# Patient Record
Sex: FEMALE | Race: WHITE | Employment: FULL TIME | URBAN - METROPOLITAN AREA
[De-identification: names, ages, dates, MRNs, and addresses within clinical notes are randomized per-mention and may not be internally consistent; named-entity substitution may affect disease eponyms.]

---

## 2018-03-12 ENCOUNTER — OFFICE VISIT (OUTPATIENT)
Dept: FAMILY MEDICINE CLINIC | Age: 48
End: 2018-03-12
Payer: COMMERCIAL

## 2018-03-12 VITALS
RESPIRATION RATE: 18 BRPM | SYSTOLIC BLOOD PRESSURE: 110 MMHG | OXYGEN SATURATION: 99 % | HEIGHT: 62 IN | DIASTOLIC BLOOD PRESSURE: 76 MMHG | WEIGHT: 153 LBS | BODY MASS INDEX: 28.16 KG/M2 | HEART RATE: 89 BPM | TEMPERATURE: 97.7 F

## 2018-03-12 DIAGNOSIS — M25.561 CHRONIC PAIN OF BOTH KNEES: ICD-10-CM

## 2018-03-12 DIAGNOSIS — J45.20 MILD INTERMITTENT ASTHMA WITHOUT COMPLICATION: ICD-10-CM

## 2018-03-12 DIAGNOSIS — G89.29 CHRONIC PAIN OF BOTH KNEES: ICD-10-CM

## 2018-03-12 DIAGNOSIS — M54.41 ACUTE BILATERAL LOW BACK PAIN WITH BILATERAL SCIATICA: Primary | ICD-10-CM

## 2018-03-12 DIAGNOSIS — M25.562 CHRONIC PAIN OF BOTH KNEES: ICD-10-CM

## 2018-03-12 DIAGNOSIS — M54.42 ACUTE BILATERAL LOW BACK PAIN WITH BILATERAL SCIATICA: Primary | ICD-10-CM

## 2018-03-12 PROCEDURE — 99214 OFFICE O/P EST MOD 30 MIN: CPT | Performed by: FAMILY MEDICINE

## 2018-03-12 PROCEDURE — G8419 CALC BMI OUT NRM PARAM NOF/U: HCPCS | Performed by: FAMILY MEDICINE

## 2018-03-12 PROCEDURE — 1036F TOBACCO NON-USER: CPT | Performed by: FAMILY MEDICINE

## 2018-03-12 PROCEDURE — G8427 DOCREV CUR MEDS BY ELIG CLIN: HCPCS | Performed by: FAMILY MEDICINE

## 2018-03-12 PROCEDURE — G8484 FLU IMMUNIZE NO ADMIN: HCPCS | Performed by: FAMILY MEDICINE

## 2018-03-12 RX ORDER — FLUTICASONE PROPIONATE 110 UG/1
1 AEROSOL, METERED RESPIRATORY (INHALATION) 2 TIMES DAILY
Qty: 1 INHALER | Refills: 3 | Status: SHIPPED | OUTPATIENT
Start: 2018-03-12 | End: 2018-10-25 | Stop reason: SDUPTHER

## 2018-03-12 RX ORDER — CYCLOBENZAPRINE HCL 5 MG
5 TABLET ORAL NIGHTLY PRN
Qty: 30 TABLET | Refills: 0 | Status: SHIPPED | OUTPATIENT
Start: 2018-03-12 | End: 2019-03-04

## 2018-03-12 RX ORDER — IBUPROFEN 800 MG/1
800 TABLET ORAL 2 TIMES DAILY PRN
Qty: 14 TABLET | Refills: 0 | Status: SHIPPED | OUTPATIENT
Start: 2018-03-12 | End: 2018-04-11

## 2018-03-12 RX ORDER — ALBUTEROL SULFATE 90 UG/1
2 AEROSOL, METERED RESPIRATORY (INHALATION) EVERY 6 HOURS PRN
Qty: 1 INHALER | Refills: 3 | Status: SHIPPED | OUTPATIENT
Start: 2018-03-12 | End: 2018-10-25 | Stop reason: SDUPTHER

## 2018-03-12 ASSESSMENT — PATIENT HEALTH QUESTIONNAIRE - PHQ9
SUM OF ALL RESPONSES TO PHQ QUESTIONS 1-9: 0
SUM OF ALL RESPONSES TO PHQ9 QUESTIONS 1 & 2: 0
2. FEELING DOWN, DEPRESSED OR HOPELESS: 0
1. LITTLE INTEREST OR PLEASURE IN DOING THINGS: 0

## 2018-03-12 NOTE — PROGRESS NOTES
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 patient that presents today for back pain. Back Pain: Patient presents for presents evaluation of back problems. Pain involves low lack and right knee. This is a/an recent problem. This has been going on for 3 week(s). Exacerbating factors include movement. Alleviating factors include nothing. Pain is intermittent in nature. Pain is not radiating. This has happen to patient before. Previous treatments: time. Prior, has gone away on its own, but this has been prolonged. Does deny bowel or bladder incontinence or saddle anesthesia. Has had sciatic pain in the past and this does not feel similar. No injury. Also complaining of bilateral knee pain. 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 Stiff. Hard to get out of bed in AM. Ibuprofen helps. Patient's past medical, surgical, social and/or family history reviewed, updated in chart, and are non-contributory (unless otherwise stated). Medications and allergies also reviewed and updated in chart.      ROS  Review of Systems - General ROS: negative for - chills, fatigue, fever, night sweats, sleep disturbance, weight gain or weight loss  Psychological ROS: negative for - anxiety, behavioral disorder, depression, hallucinations, irritability, memory difficulties, mood swings, sleep disturbances or suicidal ideation  ENT ROS: negative for - epistaxis, headaches, hearing change, nasal congestion, nasal discharge, nasal polyps, sinus pain, tinnitus, vertigo or visual changes  Hematological and Lymphatic ROS: negative for - bleeding problems, blood clots, fatigue or swollen lymph nodes  Respiratory ROS: negative for - cough, orthopnea, shortness of breath, sputum changes, tachypnea or wheezing  Cardiovascular ROS: negative for - chest pain, dyspnea on exertion, irregular heartbeat, loss of consciousness, palpitations, paroxysmal nocturnal dyspnea or rapid heart rate  Gastrointestinal ROS: negative for - abdominal pain, blood in stools, change in bowel habits, constipation, diarrhea, gas/bloating, heartburn or nausea/vomiting  Musculoskeletal ROS: negative for - joint pain, joint stiffness, joint swelling or muscle, back pain, bowel or bladder incontinence  Neurological ROS: negative for - behavioral changes, confusion, dizziness, headaches, memory loss, numbness/tingling, seizures or speech problems, weakness  Dermatological ROS: negative for - dry skin, mole changes, nail changes, pruritus, rash or skin lesion changes    Physical Exam    Wt Readings from Last 3 Encounters:   03/12/18 153 lb (69.4 kg)   12/06/16 135 lb (61.2 kg)   12/05/16 138 lb (62.6 kg)     BP Readings from Last 3 Encounters:   03/12/18 110/76   12/06/16 100/64   12/05/16 118/74     Pulse Readings from Last 3 Encounters:   03/12/18 89   12/06/16 92   12/05/16 90       General appearance: alert, well appearing, and in no distress, oriented to person, place, and time and normal appearing weight. CVS exam: normal rate, regular rhythm, normal S1, S2, no murmurs, rubs, clicks or gallops. Radial pulses 2+ bilateral.  PT/DP pulse 2+ bilat. No C/C/E    Chest: clear to auscultation, no wheezes, rales or rhonchi, symmetric air entry. Musculoskeletal: MS 5/5, DTR 2/4 x4 extremities, FROM F/E spine, LS tenderness, no obvious masses or deformities.  Gait normal. Leaning over exam table  FROM knees-B, B joint line tenderness, negative A/P drawer, crepitus    Abdomen: Soft, non-tender, non-distended, positive BS in all 4 quadrants    Extremities:Dorsalis pedis pulses palpated bilaterally, no clubbing, cyanosis, edema or erythema     SKIN: no lesions, jaundice, petechiae, pallor, cyanosis, ecchymosis    NEURO: gross motor exam normal by observation, gait normal      Assessment/Plan  James Bangura was seen today for back pain and

## 2018-04-03 ENCOUNTER — HOSPITAL ENCOUNTER (EMERGENCY)
Age: 48
Discharge: HOME | End: 2018-04-03
Attending: EMERGENCY MEDICINE
Payer: COMMERCIAL

## 2018-04-03 ENCOUNTER — TELEPHONE (OUTPATIENT)
Dept: FAMILY MEDICINE CLINIC | Age: 48
End: 2018-04-03

## 2018-04-03 VITALS
BODY MASS INDEX: 28.16 KG/M2 | RESPIRATION RATE: 14 BRPM | SYSTOLIC BLOOD PRESSURE: 106 MMHG | HEART RATE: 106 BPM | DIASTOLIC BLOOD PRESSURE: 70 MMHG | OXYGEN SATURATION: 98 % | HEIGHT: 62 IN | WEIGHT: 153 LBS | TEMPERATURE: 98.7 F

## 2018-04-03 DIAGNOSIS — M54.50 CHRONIC BILATERAL LOW BACK PAIN WITHOUT SCIATICA: Primary | ICD-10-CM

## 2018-04-03 DIAGNOSIS — H92.01 RIGHT EAR PAIN: ICD-10-CM

## 2018-04-03 DIAGNOSIS — M79.2 NEURITIS: ICD-10-CM

## 2018-04-03 DIAGNOSIS — R07.0 THROAT PAIN: ICD-10-CM

## 2018-04-03 DIAGNOSIS — G89.29 CHRONIC BILATERAL LOW BACK PAIN WITHOUT SCIATICA: Primary | ICD-10-CM

## 2018-04-03 PROCEDURE — 96372 THER/PROPH/DIAG INJ SC/IM: CPT

## 2018-04-03 PROCEDURE — 99283 EMERGENCY DEPT VISIT LOW MDM: CPT

## 2018-04-03 PROCEDURE — 6360000002 HC RX W HCPCS: Performed by: EMERGENCY MEDICINE

## 2018-04-03 RX ORDER — GABAPENTIN 300 MG/1
300 CAPSULE ORAL 3 TIMES DAILY
Qty: 30 CAPSULE | Refills: 0 | Status: SHIPPED | OUTPATIENT
Start: 2018-04-03 | End: 2018-04-11 | Stop reason: SDUPTHER

## 2018-04-03 RX ORDER — DEXAMETHASONE SODIUM PHOSPHATE 4 MG/ML
4 INJECTION, SOLUTION INTRA-ARTICULAR; INTRALESIONAL; INTRAMUSCULAR; INTRAVENOUS; SOFT TISSUE ONCE
Status: COMPLETED | OUTPATIENT
Start: 2018-04-03 | End: 2018-04-03

## 2018-04-03 RX ADMIN — DEXAMETHASONE SODIUM PHOSPHATE 4 MG: 4 INJECTION, SOLUTION INTRAMUSCULAR; INTRAVENOUS at 09:05

## 2018-04-03 ASSESSMENT — PAIN DESCRIPTION - DESCRIPTORS: DESCRIPTORS: CONSTANT

## 2018-04-03 ASSESSMENT — PAIN DESCRIPTION - ORIENTATION: ORIENTATION: RIGHT

## 2018-04-03 ASSESSMENT — PAIN DESCRIPTION - PAIN TYPE: TYPE: ACUTE PAIN

## 2018-04-03 ASSESSMENT — PAIN SCALES - GENERAL: PAINLEVEL_OUTOF10: 10

## 2018-04-03 ASSESSMENT — PAIN DESCRIPTION - LOCATION: LOCATION: FACE

## 2018-04-11 ENCOUNTER — OFFICE VISIT (OUTPATIENT)
Dept: FAMILY MEDICINE CLINIC | Age: 48
End: 2018-04-11
Payer: COMMERCIAL

## 2018-04-11 VITALS
BODY MASS INDEX: 28.74 KG/M2 | HEIGHT: 62 IN | TEMPERATURE: 96.6 F | SYSTOLIC BLOOD PRESSURE: 98 MMHG | WEIGHT: 156.2 LBS | OXYGEN SATURATION: 99 % | HEART RATE: 91 BPM | RESPIRATION RATE: 16 BRPM | DIASTOLIC BLOOD PRESSURE: 60 MMHG

## 2018-04-11 DIAGNOSIS — R68.84 JAW PAIN: Primary | ICD-10-CM

## 2018-04-11 PROCEDURE — 99213 OFFICE O/P EST LOW 20 MIN: CPT | Performed by: FAMILY MEDICINE

## 2018-04-11 PROCEDURE — G8419 CALC BMI OUT NRM PARAM NOF/U: HCPCS | Performed by: FAMILY MEDICINE

## 2018-04-11 PROCEDURE — 1036F TOBACCO NON-USER: CPT | Performed by: FAMILY MEDICINE

## 2018-04-11 PROCEDURE — G8427 DOCREV CUR MEDS BY ELIG CLIN: HCPCS | Performed by: FAMILY MEDICINE

## 2018-04-11 RX ORDER — GABAPENTIN 300 MG/1
300 CAPSULE ORAL 3 TIMES DAILY
Qty: 90 CAPSULE | Refills: 1 | Status: SHIPPED | OUTPATIENT
Start: 2018-04-11 | End: 2018-08-27 | Stop reason: ALTCHOICE

## 2018-08-27 ENCOUNTER — OFFICE VISIT (OUTPATIENT)
Dept: FAMILY MEDICINE CLINIC | Age: 48
End: 2018-08-27
Payer: COMMERCIAL

## 2018-08-27 VITALS
HEART RATE: 86 BPM | OXYGEN SATURATION: 98 % | SYSTOLIC BLOOD PRESSURE: 98 MMHG | RESPIRATION RATE: 16 BRPM | HEIGHT: 62 IN | BODY MASS INDEX: 28.49 KG/M2 | WEIGHT: 154.8 LBS | TEMPERATURE: 97.1 F | DIASTOLIC BLOOD PRESSURE: 60 MMHG

## 2018-08-27 DIAGNOSIS — M54.42 ACUTE BILATERAL LOW BACK PAIN WITH BILATERAL SCIATICA: Primary | ICD-10-CM

## 2018-08-27 DIAGNOSIS — M54.41 ACUTE BILATERAL LOW BACK PAIN WITH BILATERAL SCIATICA: Primary | ICD-10-CM

## 2018-08-27 PROCEDURE — 99213 OFFICE O/P EST LOW 20 MIN: CPT | Performed by: FAMILY MEDICINE

## 2018-08-27 PROCEDURE — 1036F TOBACCO NON-USER: CPT | Performed by: FAMILY MEDICINE

## 2018-08-27 PROCEDURE — G8419 CALC BMI OUT NRM PARAM NOF/U: HCPCS | Performed by: FAMILY MEDICINE

## 2018-08-27 PROCEDURE — G8427 DOCREV CUR MEDS BY ELIG CLIN: HCPCS | Performed by: FAMILY MEDICINE

## 2018-08-27 RX ORDER — GABAPENTIN 300 MG/1
300 CAPSULE ORAL 3 TIMES DAILY
COMMUNITY
End: 2020-06-08 | Stop reason: CLARIF

## 2018-09-27 ENCOUNTER — HOSPITAL ENCOUNTER (OUTPATIENT)
Age: 48
Discharge: HOME | End: 2018-09-29
Payer: COMMERCIAL

## 2018-09-27 ENCOUNTER — HOSPITAL ENCOUNTER (OUTPATIENT)
Dept: GENERAL RADIOLOGY | Age: 48
Discharge: HOME | End: 2018-09-29
Payer: COMMERCIAL

## 2018-09-27 DIAGNOSIS — M54.42 ACUTE BILATERAL LOW BACK PAIN WITH BILATERAL SCIATICA: ICD-10-CM

## 2018-09-27 DIAGNOSIS — M54.41 ACUTE BILATERAL LOW BACK PAIN WITH BILATERAL SCIATICA: ICD-10-CM

## 2018-09-27 PROCEDURE — 72110 X-RAY EXAM L-2 SPINE 4/>VWS: CPT

## 2018-10-25 ENCOUNTER — OFFICE VISIT (OUTPATIENT)
Dept: FAMILY MEDICINE CLINIC | Age: 48
End: 2018-10-25
Payer: COMMERCIAL

## 2018-10-25 VITALS
HEART RATE: 86 BPM | WEIGHT: 153.8 LBS | RESPIRATION RATE: 16 BRPM | BODY MASS INDEX: 28.13 KG/M2 | SYSTOLIC BLOOD PRESSURE: 94 MMHG | OXYGEN SATURATION: 99 % | TEMPERATURE: 96.7 F | DIASTOLIC BLOOD PRESSURE: 60 MMHG

## 2018-10-25 DIAGNOSIS — Z23 FLU VACCINE NEED: ICD-10-CM

## 2018-10-25 DIAGNOSIS — M51.36 DDD (DEGENERATIVE DISC DISEASE), LUMBAR: Primary | ICD-10-CM

## 2018-10-25 DIAGNOSIS — J45.20 MILD INTERMITTENT ASTHMA WITHOUT COMPLICATION: ICD-10-CM

## 2018-10-25 PROCEDURE — G8419 CALC BMI OUT NRM PARAM NOF/U: HCPCS | Performed by: FAMILY MEDICINE

## 2018-10-25 PROCEDURE — 99214 OFFICE O/P EST MOD 30 MIN: CPT | Performed by: FAMILY MEDICINE

## 2018-10-25 PROCEDURE — 90471 IMMUNIZATION ADMIN: CPT | Performed by: FAMILY MEDICINE

## 2018-10-25 PROCEDURE — 1036F TOBACCO NON-USER: CPT | Performed by: FAMILY MEDICINE

## 2018-10-25 PROCEDURE — G8427 DOCREV CUR MEDS BY ELIG CLIN: HCPCS | Performed by: FAMILY MEDICINE

## 2018-10-25 PROCEDURE — G8482 FLU IMMUNIZE ORDER/ADMIN: HCPCS | Performed by: FAMILY MEDICINE

## 2018-10-25 PROCEDURE — 90686 IIV4 VACC NO PRSV 0.5 ML IM: CPT | Performed by: FAMILY MEDICINE

## 2018-10-25 RX ORDER — FLUTICASONE PROPIONATE 110 UG/1
1 AEROSOL, METERED RESPIRATORY (INHALATION) 2 TIMES DAILY
Qty: 1 INHALER | Refills: 5 | Status: SHIPPED | OUTPATIENT
Start: 2018-10-25 | End: 2019-12-10 | Stop reason: SDUPTHER

## 2018-10-25 RX ORDER — ALBUTEROL SULFATE 90 UG/1
2 AEROSOL, METERED RESPIRATORY (INHALATION) EVERY 6 HOURS PRN
Qty: 1 INHALER | Refills: 5 | Status: SHIPPED | OUTPATIENT
Start: 2018-10-25 | End: 2019-12-10 | Stop reason: SDUPTHER

## 2018-10-25 ASSESSMENT — ENCOUNTER SYMPTOMS
CONSTIPATION: 0
BACK PAIN: 1
VOMITING: 0
VOICE CHANGE: 0
CHEST TIGHTNESS: 0
PHOTOPHOBIA: 0
ABDOMINAL PAIN: 0
NAUSEA: 0
SHORTNESS OF BREATH: 0
SINUS PRESSURE: 0
DIARRHEA: 0
SORE THROAT: 0
COUGH: 0
BLOOD IN STOOL: 0
WHEEZING: 0
TROUBLE SWALLOWING: 0

## 2018-11-08 ENCOUNTER — HOSPITAL ENCOUNTER (OUTPATIENT)
Dept: PHYSICAL THERAPY | Age: 48
Setting detail: THERAPIES SERIES
Discharge: HOME | End: 2018-11-08
Payer: COMMERCIAL

## 2018-11-08 PROCEDURE — 97161 PT EVAL LOW COMPLEX 20 MIN: CPT | Performed by: PHYSICAL THERAPIST

## 2018-11-08 PROCEDURE — G8981 BODY POS CURRENT STATUS: HCPCS | Performed by: PHYSICAL THERAPIST

## 2018-11-08 PROCEDURE — G8982 BODY POS GOAL STATUS: HCPCS | Performed by: PHYSICAL THERAPIST

## 2018-11-08 ASSESSMENT — PAIN DESCRIPTION - ORIENTATION: ORIENTATION: RIGHT;LEFT

## 2018-11-08 ASSESSMENT — PAIN DESCRIPTION - PAIN TYPE: TYPE: CHRONIC PAIN

## 2018-11-08 ASSESSMENT — PAIN DESCRIPTION - LOCATION: LOCATION: BACK;BUTTOCKS

## 2018-11-08 ASSESSMENT — PAIN SCALES - GENERAL: PAINLEVEL_OUTOF10: 6

## 2018-11-14 ENCOUNTER — HOSPITAL ENCOUNTER (OUTPATIENT)
Dept: PHYSICAL THERAPY | Age: 48
Setting detail: THERAPIES SERIES
Discharge: HOME | End: 2018-11-14
Payer: COMMERCIAL

## 2018-11-14 PROCEDURE — 97110 THERAPEUTIC EXERCISES: CPT | Performed by: PHYSICAL THERAPIST

## 2018-11-16 ENCOUNTER — HOSPITAL ENCOUNTER (OUTPATIENT)
Dept: PHYSICAL THERAPY | Age: 48
Setting detail: THERAPIES SERIES
Discharge: HOME | End: 2018-11-16
Payer: COMMERCIAL

## 2018-11-16 PROCEDURE — 97110 THERAPEUTIC EXERCISES: CPT

## 2018-11-21 ENCOUNTER — HOSPITAL ENCOUNTER (OUTPATIENT)
Dept: PHYSICAL THERAPY | Age: 48
Setting detail: THERAPIES SERIES
Discharge: HOME | End: 2018-11-21
Payer: COMMERCIAL

## 2018-11-21 PROCEDURE — 97110 THERAPEUTIC EXERCISES: CPT | Performed by: PHYSICAL THERAPIST

## 2018-11-28 ENCOUNTER — HOSPITAL ENCOUNTER (OUTPATIENT)
Dept: PHYSICAL THERAPY | Age: 48
Setting detail: THERAPIES SERIES
Discharge: HOME | End: 2018-11-28
Payer: COMMERCIAL

## 2018-11-28 PROCEDURE — 97110 THERAPEUTIC EXERCISES: CPT | Performed by: PHYSICAL THERAPIST

## 2018-11-28 NOTE — PROGRESS NOTES
[address] [address] [address]      Phone: [phone]  Fax: [phone]    Physical Therapy Daily Treatment Note  Date:  2018    Patient Name:  Mahesh Preciado    :  1970  MRN: [id number]    Restrictions/Precautions:    Diagnosis:  Lumbar DDD  Treatment Diagnosis:    Insurance[facility]Certification [facility]:  [facility]  Referring Physician:  Maikel Mora DO  Plan of care signed (Y/N):    Visit# / total visits:  5/8-10  Pain level: /10   Time In:  5502  Time Out:  922    Subjective:  Pt reports aching in the low back, and pain in the hip, possibly from surgical clip.     Exercises:  Exercise/Equipment Resistance/Repetitions Other comments     Recumbent bike 10 minutes      Trunk stretch with ball 15 sec x 5 reps each flex and B rotation      Hamstring stretch with PFB 20-30 sec x 3 reps B      Piriformis stretch 20-30 sec x 3 reps B      Pelvic tilts 5 sec x 20 reps      Lower trunk rotation 15 sec x 5 reps B      Core stability on ball Alternating leg lifts x 10 reps      Total gym With pelvic tilt x 20 reps                                                                                      Other Therapeutic Activities:  N/A    Home Exercise Program:  N/A    Manual Treatments:  N/A    Modalities:  N/A    Timed Code Treatment Minutes:  47    Total Treatment Minutes:  47    Treatment/Activity Tolerance:  [x] Patient tolerated treatment well [] Patient limited by fatigue  [] Patient limited by pain  [] Patient limited by other medical complications  [] Other:     Prognosis: [x] Good [] Fair  [] Poor    Patient Requires Follow-up: [x] Yes  [] No    Plan:   [x] Continue per plan of care [] Alter current plan (see comments)  [] Plan of care initiated [] Hold pending MD visit [] Discharge  Plan for Next Session:        Electronically signed by:  Kira Lacy, PT [id number]

## 2018-11-30 ENCOUNTER — HOSPITAL ENCOUNTER (OUTPATIENT)
Dept: PHYSICAL THERAPY | Age: 48
Setting detail: THERAPIES SERIES
Discharge: HOME | End: 2018-11-30
Payer: COMMERCIAL

## 2018-11-30 PROCEDURE — 97110 THERAPEUTIC EXERCISES: CPT | Performed by: PHYSICAL THERAPIST

## 2018-12-07 ENCOUNTER — HOSPITAL ENCOUNTER (OUTPATIENT)
Dept: PHYSICAL THERAPY | Age: 48
Setting detail: THERAPIES SERIES
End: 2018-12-07
Payer: COMMERCIAL

## 2018-12-12 ENCOUNTER — HOSPITAL ENCOUNTER (OUTPATIENT)
Dept: PHYSICAL THERAPY | Age: 48
Setting detail: THERAPIES SERIES
Discharge: HOME | End: 2018-12-12
Payer: COMMERCIAL

## 2018-12-12 PROCEDURE — 97110 THERAPEUTIC EXERCISES: CPT | Performed by: PHYSICAL THERAPIST

## 2018-12-14 ENCOUNTER — HOSPITAL ENCOUNTER (OUTPATIENT)
Dept: PHYSICAL THERAPY | Age: 48
Setting detail: THERAPIES SERIES
Discharge: HOME | End: 2018-12-14
Payer: COMMERCIAL

## 2018-12-14 PROCEDURE — 97110 THERAPEUTIC EXERCISES: CPT | Performed by: PHYSICAL THERAPIST

## 2018-12-19 ENCOUNTER — HOSPITAL ENCOUNTER (OUTPATIENT)
Dept: PHYSICAL THERAPY | Age: 48
Setting detail: THERAPIES SERIES
Discharge: HOME | End: 2018-12-19
Payer: COMMERCIAL

## 2018-12-19 PROCEDURE — 97110 THERAPEUTIC EXERCISES: CPT | Performed by: PHYSICAL THERAPIST

## 2018-12-19 NOTE — PROGRESS NOTES
[address] [address] [address]      Phone: [phone]  Fax: [phone]    Physical Therapy Daily Treatment Note  Date:  2018    Patient Name:  Mark Bagley    :  1970  MRN: [id number]    Restrictions/Precautions:    Diagnosis:  Lumbar DDD  Treatment Diagnosis:    Insurance/Certification information:  [facility]  Referring Physician:  Ryder Salazar DO  Plan of care signed (Y/N):    Visit# / total visits:  9/8-10  Pain level: /10   Time In:  9661  Time Out:  0915    Subjective:  Pt reports the pain in her hip is overshadowing her back pain.     Exercises:  Exercise/Equipment Resistance/Repetitions Other comments     Recumbent bike 10 minutes      Trunk stretch with ball 15 sec x 5 reps each flex and B rotation      Hamstring stretch with PFB 20-30 sec x 3 reps B      Piriformis stretch 20-30 sec x 3 reps B      Pelvic tilts 5 sec x 20 reps      Lower trunk rotation 15 sec x 5 reps B      Core stability on ball Alternating leg lifts x 10 reps      DKTC with ball x20 reps                                                                               Other Therapeutic Activities:  N/A    Home Exercise Program:  N/A    Manual Treatments:  N/A    Modalities:  N/A    Timed Code Treatment Minutes:  41    Total Treatment Minutes:  41    Treatment/Activity Tolerance:  [x] Patient tolerated treatment well [] Patient limited by fatigue  [] Patient limited by pain  [] Patient limited by other medical complications  [] Other:     Prognosis: [x] Good [] Fair  [] Poor    Patient Requires Follow-up: [x] Yes  [] No    Plan:   [x] Continue per plan of care [] Alter current plan (see comments)  [] Plan of care initiated [] Hold pending MD visit [] Discharge  Plan for Next Session:        Electronically signed by:  Ger Abebe, PT [id number]

## 2018-12-21 ENCOUNTER — HOSPITAL ENCOUNTER (OUTPATIENT)
Dept: PHYSICAL THERAPY | Age: 48
Setting detail: THERAPIES SERIES
Discharge: HOME | End: 2018-12-21
Payer: COMMERCIAL

## 2018-12-21 PROCEDURE — G8982 BODY POS GOAL STATUS: HCPCS | Performed by: PHYSICAL THERAPIST

## 2018-12-21 PROCEDURE — 97110 THERAPEUTIC EXERCISES: CPT | Performed by: PHYSICAL THERAPIST

## 2018-12-21 PROCEDURE — G8983 BODY POS D/C STATUS: HCPCS | Performed by: PHYSICAL THERAPIST

## 2018-12-21 NOTE — PROGRESS NOTES
[address]                  [address]      Phone: [phone]  Fax: [phone]    Physical Therapy Daily Treatment Note  Date:  2018    Patient Name:  Amrik Borges    :  1970  MRN: [id number]    Restrictions/Precautions:    Diagnosis:  Lumbar DDD  Treatment Diagnosis:    [facility]Certification [facility]:  [facility]  Referring Physician:  Karen Yeager DO  Plan of care signed (Y/N):    Visit# / total visits:  10/8-10  Pain level: 3-4/10   Time In:  3867  Time Out:  922    Subjective:  Pt reports the pain in her hip is overshadowing her back pain.     Exercises:  Exercise/Equipment Resistance/Repetitions Other comments     Recumbent bike 10 minutes      Trunk stretch with ball 15 sec x 5 reps each flex and B rotation      Hamstring stretch with PFB 20-30 sec x 3 reps B      Piriformis stretch 20-30 sec x 3 reps B      Pelvic tilts 5 sec x 20 reps      Lower trunk rotation 15 sec x 5 reps B      Core stability on ball Alternating leg lifts x 10 reps      DKTC with ball x20 reps                                                                               Other Therapeutic Activities:  N/A    Home Exercise Program:  18 - piriformis stretch, lower trunk rotation, DKTC, hamstring stretch, seated trunk stretch    Manual Treatments:  N/A    Modalities:  N/A    Timed Code Treatment Minutes:  40    Total Treatment Minutes:  40    Treatment/Activity Tolerance:  [x] Patient tolerated treatment well [] Patient limited by fatigue  [] Patient limited by pain  [] Patient limited by other medical complications  [] Other:     Prognosis: [x] Good [] Fair  [] Poor    Patient Requires Follow-up: [] Yes  [x] No    Plan:   [] Continue per plan of care [] Alter current plan (see comments)  [] Plan of care initiated [] Hold pending MD visit [x] Discharge  Plan for Next Session:        Electronically signed by:  Tanvir Mello, PT [id number]

## 2019-03-04 ENCOUNTER — OFFICE VISIT (OUTPATIENT)
Dept: FAMILY MEDICINE CLINIC | Age: 49
End: 2019-03-04
Payer: COMMERCIAL

## 2019-03-04 VITALS
TEMPERATURE: 97.2 F | WEIGHT: 156 LBS | SYSTOLIC BLOOD PRESSURE: 120 MMHG | DIASTOLIC BLOOD PRESSURE: 70 MMHG | RESPIRATION RATE: 16 BRPM | HEART RATE: 89 BPM | BODY MASS INDEX: 28.53 KG/M2 | OXYGEN SATURATION: 99 %

## 2019-03-04 DIAGNOSIS — G50.0 TRIGEMINAL NEURALGIA OF RIGHT SIDE OF FACE: Primary | ICD-10-CM

## 2019-03-04 PROCEDURE — 1036F TOBACCO NON-USER: CPT | Performed by: PHYSICIAN ASSISTANT

## 2019-03-04 PROCEDURE — G8482 FLU IMMUNIZE ORDER/ADMIN: HCPCS | Performed by: PHYSICIAN ASSISTANT

## 2019-03-04 PROCEDURE — G8427 DOCREV CUR MEDS BY ELIG CLIN: HCPCS | Performed by: PHYSICIAN ASSISTANT

## 2019-03-04 PROCEDURE — G8419 CALC BMI OUT NRM PARAM NOF/U: HCPCS | Performed by: PHYSICIAN ASSISTANT

## 2019-03-04 PROCEDURE — 99213 OFFICE O/P EST LOW 20 MIN: CPT | Performed by: PHYSICIAN ASSISTANT

## 2019-03-04 RX ORDER — PREDNISONE 20 MG/1
20 TABLET ORAL 2 TIMES DAILY
Qty: 10 TABLET | Refills: 0 | Status: SHIPPED | OUTPATIENT
Start: 2019-03-04 | End: 2019-03-09

## 2019-03-04 ASSESSMENT — ENCOUNTER SYMPTOMS
DIARRHEA: 0
VOMITING: 0
NAUSEA: 0
COUGH: 0
SHORTNESS OF BREATH: 0
ABDOMINAL PAIN: 0

## 2019-03-04 ASSESSMENT — PATIENT HEALTH QUESTIONNAIRE - PHQ9
SUM OF ALL RESPONSES TO PHQ QUESTIONS 1-9: 0
SUM OF ALL RESPONSES TO PHQ9 QUESTIONS 1 & 2: 0
1. LITTLE INTEREST OR PLEASURE IN DOING THINGS: 0
2. FEELING DOWN, DEPRESSED OR HOPELESS: 0
SUM OF ALL RESPONSES TO PHQ QUESTIONS 1-9: 0

## 2019-03-05 ASSESSMENT — ENCOUNTER SYMPTOMS
FACIAL SWELLING: 0
SORE THROAT: 0
TROUBLE SWALLOWING: 0
SINUS PRESSURE: 0

## 2019-08-14 DIAGNOSIS — Z12.31 SCREENING MAMMOGRAM, ENCOUNTER FOR: Primary | ICD-10-CM

## 2019-08-27 ENCOUNTER — HOSPITAL ENCOUNTER (OUTPATIENT)
Dept: GENERAL RADIOLOGY | Age: 49
Discharge: HOME | End: 2019-08-29
Payer: COMMERCIAL

## 2019-08-27 DIAGNOSIS — Z12.31 SCREENING MAMMOGRAM, ENCOUNTER FOR: ICD-10-CM

## 2019-08-27 PROCEDURE — 77063 BREAST TOMOSYNTHESIS BI: CPT

## 2019-12-10 ENCOUNTER — OFFICE VISIT (OUTPATIENT)
Dept: FAMILY MEDICINE CLINIC | Age: 49
End: 2019-12-10
Payer: COMMERCIAL

## 2019-12-10 VITALS
RESPIRATION RATE: 18 BRPM | SYSTOLIC BLOOD PRESSURE: 118 MMHG | HEIGHT: 62 IN | OXYGEN SATURATION: 99 % | HEART RATE: 87 BPM | WEIGHT: 157 LBS | BODY MASS INDEX: 28.89 KG/M2 | TEMPERATURE: 98 F | DIASTOLIC BLOOD PRESSURE: 74 MMHG

## 2019-12-10 DIAGNOSIS — Z23 NEED FOR VACCINATION: ICD-10-CM

## 2019-12-10 DIAGNOSIS — J45.20 MILD INTERMITTENT ASTHMA WITHOUT COMPLICATION: Primary | ICD-10-CM

## 2019-12-10 PROCEDURE — G8419 CALC BMI OUT NRM PARAM NOF/U: HCPCS | Performed by: FAMILY MEDICINE

## 2019-12-10 PROCEDURE — 90471 IMMUNIZATION ADMIN: CPT | Performed by: FAMILY MEDICINE

## 2019-12-10 PROCEDURE — 1036F TOBACCO NON-USER: CPT | Performed by: FAMILY MEDICINE

## 2019-12-10 PROCEDURE — 99213 OFFICE O/P EST LOW 20 MIN: CPT | Performed by: FAMILY MEDICINE

## 2019-12-10 PROCEDURE — G8427 DOCREV CUR MEDS BY ELIG CLIN: HCPCS | Performed by: FAMILY MEDICINE

## 2019-12-10 PROCEDURE — G8482 FLU IMMUNIZE ORDER/ADMIN: HCPCS | Performed by: FAMILY MEDICINE

## 2019-12-10 PROCEDURE — 90686 IIV4 VACC NO PRSV 0.5 ML IM: CPT | Performed by: FAMILY MEDICINE

## 2019-12-10 RX ORDER — ALBUTEROL SULFATE 90 UG/1
2 AEROSOL, METERED RESPIRATORY (INHALATION) EVERY 6 HOURS PRN
Qty: 1 INHALER | Refills: 5 | Status: SHIPPED
Start: 2019-12-10 | End: 2020-12-10

## 2019-12-10 RX ORDER — FLUTICASONE PROPIONATE 110 UG/1
1 AEROSOL, METERED RESPIRATORY (INHALATION) 2 TIMES DAILY
Qty: 1 INHALER | Refills: 5 | Status: SHIPPED
Start: 2019-12-10 | End: 2021-02-01

## 2020-06-08 ENCOUNTER — OFFICE VISIT (OUTPATIENT)
Dept: FAMILY MEDICINE CLINIC | Age: 50
End: 2020-06-08
Payer: COMMERCIAL

## 2020-06-08 VITALS
HEIGHT: 62 IN | OXYGEN SATURATION: 99 % | WEIGHT: 159 LBS | TEMPERATURE: 97.6 F | SYSTOLIC BLOOD PRESSURE: 100 MMHG | BODY MASS INDEX: 29.26 KG/M2 | DIASTOLIC BLOOD PRESSURE: 62 MMHG | RESPIRATION RATE: 16 BRPM | HEART RATE: 96 BPM

## 2020-06-08 LAB
BILIRUBIN, POC: NORMAL
BLOOD URINE, POC: NORMAL
CLARITY, POC: CLEAR
COLOR, POC: YELLOW
GLUCOSE URINE, POC: NORMAL
KETONES, POC: NORMAL
LEUKOCYTE EST, POC: NORMAL
NITRITE, POC: NORMAL
PH, POC: 5
PROTEIN, POC: NORMAL
SPECIFIC GRAVITY, POC: 1.03
UROBILINOGEN, POC: 0.2

## 2020-06-08 PROCEDURE — 99396 PREV VISIT EST AGE 40-64: CPT | Performed by: FAMILY MEDICINE

## 2020-06-08 PROCEDURE — 90715 TDAP VACCINE 7 YRS/> IM: CPT | Performed by: FAMILY MEDICINE

## 2020-06-08 PROCEDURE — 90471 IMMUNIZATION ADMIN: CPT | Performed by: FAMILY MEDICINE

## 2020-06-08 PROCEDURE — 81003 URINALYSIS AUTO W/O SCOPE: CPT | Performed by: FAMILY MEDICINE

## 2020-06-08 ASSESSMENT — PATIENT HEALTH QUESTIONNAIRE - PHQ9
SUM OF ALL RESPONSES TO PHQ QUESTIONS 1-9: 0
2. FEELING DOWN, DEPRESSED OR HOPELESS: 0
SUM OF ALL RESPONSES TO PHQ9 QUESTIONS 1 & 2: 0
1. LITTLE INTEREST OR PLEASURE IN DOING THINGS: 0
SUM OF ALL RESPONSES TO PHQ QUESTIONS 1-9: 0

## 2020-06-08 NOTE — PROGRESS NOTES
6/10/2020    Chief Complaint   Patient presents with    Annual Exam    Back Pain     hurts before she has to urinate       Screening Questions:    Blood pressure:  100/62   Sustained BP > 135/80 mm Hg Screened for type 2 diabetes mellitus:  No.   Family history of CAD/CVA:  No   Cholesterol every 5 years:  No   Exercise 30 minutes daily 5 times weekly:  No   Mammogram every 1-2 years age 36, then annually after age 48: Yes   Breast exam annually after age 36: Yes   Pap smear every 1-3 years: Yes   LMP  Regular    Personal or family history JOSÉ MIGUEL LI breast, no ovarian or colon cancer: Yes    Abuse/Intimate partner violence:  No   Sexually active: Yes. Counseled on risk reduction for sexually transmitted infections. Adults 72 years and younger should be screened for human immunodeficiency virus. Adults 25 and younger tested for Chlamydia, Gonorrhea, Syphillis    Sexual problems: Yes      Lack of interest    Orgasm   Specialists:  No    CIBH:  No   Eye exam every 2-4 years, yearly if diabetic:  Yes    Dental exam:  No    Hearing Evaluation/Hearing Aid:  No    BMI elevated: Body mass index is 29.08 kg/m². Suni Hayden Counseled on weight loss   Depression:  No    Tobacco use: No  . Cessation discussed    Alcohol use:  No      Immunizations  Immunization status: up to date and documented.  Tetanus shot every 10 years:  due     Labs:  No results found for: EAG  Lab Results   Component Value Date    LDLCALC 83 04/19/2016      CBC:   Lab Results   Component Value Date    WBC 10.7 12/05/2016    RBC 4.48 12/05/2016    HGB 13.4 12/05/2016    HCT 40.5 12/05/2016    MCV 90.5 12/05/2016    MCH 29.8 12/05/2016    MCHC 33.0 12/05/2016    RDW 11.7 12/05/2016     12/05/2016    MPV 9.5 12/05/2016         Patient's past medical, surgical, social and/or family history reviewed, updated in chart, and are non-contributory (unless otherwise stated).   Medications and allergies also reviewed and updated in chart.    ROS  Review of Systems - General ROS: negative for - chills, fatigue, fever, night sweats, sleep disturbance, weight gain or weight loss  Psychological ROS: negative for - anxiety, behavioral disorder, depression, hallucinations, irritability, memory difficulties, mood swings, sleep disturbances or suicidal ideation  ENT ROS: negative for - epistaxis, headaches, hearing change, nasal congestion, nasal discharge, nasal polyps, sinus pain, tinnitus, vertigo or visual changes  Hematological and Lymphatic ROS: negative for - bleeding problems, blood clots, fatigue or swollen lymph nodes  Respiratory ROS: negative for - cough, orthopnea, shortness of breath, sputum changes, tachypnea or wheezing  Cardiovascular ROS: negative for - chest pain, dyspnea on exertion, irregular heartbeat, loss of consciousness, palpitations, paroxysmal nocturnal dyspnea or rapid heart rate  Gastrointestinal ROS: negative for - abdominal pain, blood in stools, change in bowel habits, constipation, diarrhea, gas/bloating, heartburn or nausea/vomiting  Musculoskeletal ROS: negative for - joint pain, joint stiffness, joint swelling or muscle, back pain, bowel or bladder incontinence  Neurological ROS: negative for - behavioral changes, confusion, dizziness, headaches, memory loss, numbness/tingling, seizures or speech problems, weakness  Dermatological ROS: negative for - dry skin, mole changes, nail changes, pruritus, rash or skin lesion changes    Physical Exam  /62   Pulse 96   Temp 97.6 °F (36.4 °C)   Resp 16   Ht 5' 2\" (1.575 m)   Wt 159 lb (72.1 kg)   SpO2 99%   BMI 29.08 kg/m²   Wt Readings from Last 3 Encounters:   06/08/20 159 lb (72.1 kg)   12/10/19 157 lb (71.2 kg)   03/04/19 156 lb (70.8 kg)     Temp Readings from Last 3 Encounters:   06/08/20 97.6 °F (36.4 °C)   12/10/19 98 °F (36.7 °C)   03/04/19 97.2 °F (36.2 °C) (Oral)     BP Readings from Last 3 Encounters:   06/08/20 100/62   12/10/19 118/74   03/04/19

## 2020-07-01 ENCOUNTER — HOSPITAL ENCOUNTER (OUTPATIENT)
Age: 50
Discharge: HOME | End: 2020-07-01
Payer: COMMERCIAL

## 2020-07-01 LAB
ALBUMIN SERPL-MCNC: 4.3 G/DL (ref 3.5–5.2)
ALP BLD-CCNC: 75 U/L (ref 35–104)
ALT SERPL-CCNC: 12 U/L (ref 0–32)
ANION GAP SERPL CALCULATED.3IONS-SCNC: 11 MMOL/L (ref 7–16)
AST SERPL-CCNC: 16 U/L (ref 0–31)
BILIRUB SERPL-MCNC: 0.4 MG/DL (ref 0–1.2)
BUN BLDV-MCNC: 12 MG/DL (ref 6–20)
CALCIUM SERPL-MCNC: 9.4 MG/DL (ref 8.6–10.2)
CHLORIDE BLD-SCNC: 101 MMOL/L (ref 98–107)
CHOLESTEROL, TOTAL: 169 MG/DL (ref 0–199)
CO2: 27 MMOL/L (ref 22–29)
CREAT SERPL-MCNC: 0.8 MG/DL (ref 0.5–1)
GFR AFRICAN AMERICAN: >60
GFR NON-AFRICAN AMERICAN: >60 ML/MIN/1.73
GLUCOSE BLD-MCNC: 107 MG/DL (ref 74–99)
HCT VFR BLD CALC: 38.5 % (ref 34–48)
HDLC SERPL-MCNC: 51 MG/DL
HEMOGLOBIN: 12.8 G/DL (ref 11.5–15.5)
LDL CHOLESTEROL CALCULATED: 102 MG/DL (ref 0–99)
MCH RBC QN AUTO: 31.1 PG (ref 26–35)
MCHC RBC AUTO-ENTMCNC: 33.2 % (ref 32–34.5)
MCV RBC AUTO: 93.4 FL (ref 80–99.9)
PDW BLD-RTO: 12.3 FL (ref 11.5–15)
PLATELET # BLD: 236 E9/L (ref 130–450)
PMV BLD AUTO: 11.1 FL (ref 7–12)
POTASSIUM SERPL-SCNC: 4.5 MMOL/L (ref 3.5–5)
RBC # BLD: 4.12 E12/L (ref 3.5–5.5)
SODIUM BLD-SCNC: 139 MMOL/L (ref 132–146)
TOTAL PROTEIN: 7.8 G/DL (ref 6.4–8.3)
TRIGL SERPL-MCNC: 79 MG/DL (ref 0–149)
TSH SERPL DL<=0.05 MIU/L-ACNC: 2.44 UIU/ML (ref 0.27–4.2)
VLDLC SERPL CALC-MCNC: 16 MG/DL
WBC # BLD: 6.4 E9/L (ref 4.5–11.5)

## 2020-07-01 PROCEDURE — 80053 COMPREHEN METABOLIC PANEL: CPT

## 2020-07-01 PROCEDURE — 36415 COLL VENOUS BLD VENIPUNCTURE: CPT

## 2020-07-01 PROCEDURE — 84443 ASSAY THYROID STIM HORMONE: CPT

## 2020-07-01 PROCEDURE — 80061 LIPID PANEL: CPT

## 2020-07-01 PROCEDURE — 85027 COMPLETE CBC AUTOMATED: CPT

## 2020-08-26 ENCOUNTER — NURSE ONLY (OUTPATIENT)
Dept: FAMILY MEDICINE CLINIC | Age: 50
End: 2020-08-26
Payer: COMMERCIAL

## 2020-08-26 ENCOUNTER — HOSPITAL ENCOUNTER (OUTPATIENT)
Dept: GENERAL RADIOLOGY | Age: 50
Discharge: HOME | End: 2020-08-28
Payer: COMMERCIAL

## 2020-08-26 PROCEDURE — 86580 TB INTRADERMAL TEST: CPT | Performed by: FAMILY MEDICINE

## 2020-08-26 PROCEDURE — 90746 HEPB VACCINE 3 DOSE ADULT IM: CPT | Performed by: FAMILY MEDICINE

## 2020-08-26 PROCEDURE — 77063 BREAST TOMOSYNTHESIS BI: CPT

## 2020-08-26 PROCEDURE — 90471 IMMUNIZATION ADMIN: CPT | Performed by: FAMILY MEDICINE

## 2020-08-28 ENCOUNTER — NURSE ONLY (OUTPATIENT)
Dept: FAMILY MEDICINE CLINIC | Age: 50
End: 2020-08-28
Payer: COMMERCIAL

## 2020-08-28 ENCOUNTER — HOSPITAL ENCOUNTER (OUTPATIENT)
Age: 50
Discharge: HOME | End: 2020-08-30
Payer: COMMERCIAL

## 2020-08-28 LAB
INDURATION: 0
TB SKIN TEST: NEGATIVE

## 2020-08-28 PROCEDURE — 36415 COLL VENOUS BLD VENIPUNCTURE: CPT | Performed by: FAMILY MEDICINE

## 2020-08-28 PROCEDURE — 86765 RUBEOLA ANTIBODY: CPT

## 2020-08-28 PROCEDURE — 86762 RUBELLA ANTIBODY: CPT

## 2020-08-28 PROCEDURE — 86735 MUMPS ANTIBODY: CPT

## 2020-08-31 LAB
MEASLES IMMUNE (IGG): NORMAL
MUMPS AB IGG: NORMAL
RUBELLA ANTIBODY IGG: NORMAL

## 2020-09-08 ENCOUNTER — NURSE ONLY (OUTPATIENT)
Dept: FAMILY MEDICINE CLINIC | Age: 50
End: 2020-09-08
Payer: COMMERCIAL

## 2020-09-08 PROCEDURE — 90471 IMMUNIZATION ADMIN: CPT | Performed by: FAMILY MEDICINE

## 2020-09-08 PROCEDURE — 90707 MMR VACCINE SC: CPT | Performed by: FAMILY MEDICINE

## 2020-09-08 PROCEDURE — 86580 TB INTRADERMAL TEST: CPT | Performed by: FAMILY MEDICINE

## 2020-09-10 ENCOUNTER — NURSE ONLY (OUTPATIENT)
Dept: FAMILY MEDICINE CLINIC | Age: 50
End: 2020-09-10

## 2020-11-09 ENCOUNTER — NURSE ONLY (OUTPATIENT)
Dept: FAMILY MEDICINE CLINIC | Age: 50
End: 2020-11-09
Payer: COMMERCIAL

## 2020-11-09 PROCEDURE — 90471 IMMUNIZATION ADMIN: CPT | Performed by: FAMILY MEDICINE

## 2020-11-09 PROCEDURE — 90686 IIV4 VACC NO PRSV 0.5 ML IM: CPT | Performed by: FAMILY MEDICINE

## 2020-11-09 PROCEDURE — 90746 HEPB VACCINE 3 DOSE ADULT IM: CPT | Performed by: FAMILY MEDICINE

## 2020-11-09 PROCEDURE — 90472 IMMUNIZATION ADMIN EACH ADD: CPT | Performed by: FAMILY MEDICINE

## 2020-12-10 RX ORDER — ALBUTEROL SULFATE 90 UG/1
AEROSOL, METERED RESPIRATORY (INHALATION)
Qty: 8.5 G | Refills: 5 | Status: SHIPPED
Start: 2020-12-10 | End: 2021-11-28 | Stop reason: SDUPTHER

## 2021-01-05 ENCOUNTER — OFFICE VISIT (OUTPATIENT)
Dept: FAMILY MEDICINE CLINIC | Age: 51
End: 2021-01-05
Payer: COMMERCIAL

## 2021-01-05 VITALS
RESPIRATION RATE: 16 BRPM | SYSTOLIC BLOOD PRESSURE: 111 MMHG | DIASTOLIC BLOOD PRESSURE: 77 MMHG | TEMPERATURE: 97.6 F | HEART RATE: 93 BPM | OXYGEN SATURATION: 100 %

## 2021-01-05 DIAGNOSIS — Z20.822 EXPOSURE TO COVID-19 VIRUS: ICD-10-CM

## 2021-01-05 DIAGNOSIS — R05.9 COUGH: ICD-10-CM

## 2021-01-05 DIAGNOSIS — R09.81 HEAD CONGESTION: Primary | ICD-10-CM

## 2021-01-05 DIAGNOSIS — R09.81 HEAD CONGESTION: ICD-10-CM

## 2021-01-05 PROCEDURE — G8482 FLU IMMUNIZE ORDER/ADMIN: HCPCS | Performed by: FAMILY MEDICINE

## 2021-01-05 PROCEDURE — 1036F TOBACCO NON-USER: CPT | Performed by: FAMILY MEDICINE

## 2021-01-05 PROCEDURE — G8419 CALC BMI OUT NRM PARAM NOF/U: HCPCS | Performed by: FAMILY MEDICINE

## 2021-01-05 PROCEDURE — 99213 OFFICE O/P EST LOW 20 MIN: CPT | Performed by: FAMILY MEDICINE

## 2021-01-05 PROCEDURE — G8427 DOCREV CUR MEDS BY ELIG CLIN: HCPCS | Performed by: FAMILY MEDICINE

## 2021-01-05 NOTE — PROGRESS NOTES
1/13/2021    Chief Complaint   Patient presents with    Cough     close exposure to covid    Congestion       HPI    Holden Saleem is a 52 y.o. patient that presents today with:    Upper Respiratory Infection:  Patient complains of URI/sinusitis symptoms. Symptoms include:  Cough, Congestion and Close contact with a lab confirmed covid 19 patient. Onset of symptoms was 7 days ago, gradually worsening since that time. She is drinking plenty of fluids. Treatment to date: none with minimal ROS. Kids tested positive. Patient's past medical, surgical, social and/or family history reviewed, updated in chart, and are non-contributory (unless otherwise stated). Medications and allergies also reviewed and updated in chart.      ROS Unless otherwise specified  Review of Systems - General ROS: negative for - chills, fatigue, fever, night sweats, sleep disturbance, weight gain or weight loss  Psychological ROS: negative for - anxiety, behavioral disorder, depression, hallucinations, irritability, memory difficulties, mood swings, sleep disturbances or suicidal ideation  ENT ROS: negative for - epistaxis, headaches, hearing change, nasal congestion, nasal discharge, nasal polyps, sinus pain, tinnitus, vertigo or visual changes  Hematological and Lymphatic ROS: negative for - bleeding problems, blood clots, fatigue or swollen lymph nodes  Respiratory ROS: negative for - cough, orthopnea, shortness of breath, sputum changes, tachypnea or wheezing  Cardiovascular ROS: negative for - chest pain, dyspnea on exertion, irregular heartbeat, loss of consciousness, palpitations, paroxysmal nocturnal dyspnea or rapid heart rate  Gastrointestinal ROS: negative for - abdominal pain, blood in stools, change in bowel habits, constipation, diarrhea, gas/bloating, heartburn or nausea/vomiting  Musculoskeletal ROS: negative for - joint pain, joint stiffness, joint swelling or muscle, back pain, bowel or bladder incontinence  Neurological ROS: negative for - behavioral changes, confusion, dizziness, headaches, memory loss, numbness/tingling, seizures or speech problems, weakness  Dermatological ROS: negative for - dry skin, mole changes, nail changes, pruritus, rash or skin lesion changes    Physical Exam  /77   Pulse 93   Temp 97.6 °F (36.4 °C) (Temporal)   Resp 16   SpO2 100%     Wt Readings from Last 3 Encounters:   06/08/20 159 lb (72.1 kg)   12/10/19 157 lb (71.2 kg)   03/04/19 156 lb (70.8 kg)     BP Readings from Last 3 Encounters:   01/05/21 111/77   06/08/20 100/62   12/10/19 118/74         General appearance: alert, well appearing, and in no distress, oriented to person, place, and time and normal appearing weight. HEENT:  HEAD: Atraumatic, normocephalic  EYES: PERRL  EOM intact  EARS: bilateral TM's and external ear canals normal  NOSE: nasal mucosa, septum, turbinates normal bilaterally  MOUTH: mucous membranes moist and normal tonsils  NECK: supple, full range of motion, no mass, normal lymphadenopathy, no thyromegaly    CVS exam: normal rate, regular rhythm, normal S1, S2, no murmurs, rubs, clicks or gallops. Radial pulses 2+ bilateral.  PT/DP pulse 2+ bilat. No C/C/E    Chest: clear to auscultation, no wheezes, rales or rhonchi, symmetric air entry. SKIN: no lesions, jaundice, petechiae, pallor, cyanosis, ecchymosis        Assessment/Plan  Jose Beckford was seen today for cough and congestion. Diagnoses and all orders for this visit:    Head congestion  -     COVID-19 Ambulatory; Future    Cough  -     COVID-19 Ambulatory; Future    Exposure to COVID-19 virus  -     COVID-19 Ambulatory; Future        Advised on symptomatic relief. Tylenol or Advil for fever/pain/body aches. OTC meds prn cough/ congestion. Rest. Stay hydrated. Advised to call PCP in 3-5 days for recheck if symptoms persist. ED sooner if symptoms worsen or change.  ED immediately with high or refractory fever, SOB, CP, shaking chills, vomiting, abdominal pain, etc. Pt is in agreement with this care plan. All questions answered. No follow-ups on file. Rosi Brothers, DO    Call or go to ED immediately if symptoms worsen or persist.    Counseled regarding above diagnosis, including possible risks and complications,  especially if left uncontrolled. Counseled regarding the possible side effects, risks, benefits and alternatives to treatment; patient and/or guardian verbalizes understanding, agrees, feels comfortable with and wishes to proceed with above treatment plan. Advised patient to call with any new medication issues, and read all Rx info from pharmacy to assure aware of all possible risks and side effects of medication before taking. Patient and/or guardian verbalizes understanding and agrees with above counseling, assessment and plan. All questions answered.

## 2021-01-07 LAB
SARS-COV-2: DETECTED
SOURCE: ABNORMAL

## 2021-01-30 DIAGNOSIS — J45.20 MILD INTERMITTENT ASTHMA WITHOUT COMPLICATION: ICD-10-CM

## 2021-02-01 RX ORDER — DEXAMETHASONE 4 MG/1
TABLET ORAL
Qty: 12 G | Refills: 2 | Status: SHIPPED
Start: 2021-02-01 | End: 2021-02-22 | Stop reason: DRUGHIGH

## 2021-02-22 ENCOUNTER — VIRTUAL VISIT (OUTPATIENT)
Dept: FAMILY MEDICINE CLINIC | Age: 51
End: 2021-02-22
Payer: COMMERCIAL

## 2021-02-22 DIAGNOSIS — J45.20 MILD INTERMITTENT ASTHMA WITHOUT COMPLICATION: ICD-10-CM

## 2021-02-22 PROCEDURE — G8427 DOCREV CUR MEDS BY ELIG CLIN: HCPCS | Performed by: FAMILY MEDICINE

## 2021-02-22 PROCEDURE — G8419 CALC BMI OUT NRM PARAM NOF/U: HCPCS | Performed by: FAMILY MEDICINE

## 2021-02-22 PROCEDURE — 99213 OFFICE O/P EST LOW 20 MIN: CPT | Performed by: FAMILY MEDICINE

## 2021-02-22 PROCEDURE — 1036F TOBACCO NON-USER: CPT | Performed by: FAMILY MEDICINE

## 2021-02-22 PROCEDURE — G8482 FLU IMMUNIZE ORDER/ADMIN: HCPCS | Performed by: FAMILY MEDICINE

## 2021-02-22 RX ORDER — FLUTICASONE PROPIONATE 110 UG/1
AEROSOL, METERED RESPIRATORY (INHALATION)
Qty: 12 G | Refills: 2 | Status: CANCELLED | OUTPATIENT
Start: 2021-02-22

## 2021-02-22 RX ORDER — FLUTICASONE PROPIONATE 220 UG/1
2 AEROSOL, METERED RESPIRATORY (INHALATION) 2 TIMES DAILY
Qty: 1 INHALER | Refills: 5 | Status: SHIPPED
Start: 2021-02-22 | End: 2022-01-13 | Stop reason: SDUPTHER

## 2021-02-22 NOTE — PROGRESS NOTES
TeleMedicine Patient Consent    This visit was performed as a virtual video visit using a synchronous, two-way, audio-video telehealth technology platform. Patient identification was verified at the start of the visit, including the patient's telephone number and physical location. I discussed with the patient the nature of our telehealth visits, that:     1. Due to the nature of an audio- video modality, the only components of a physical exam that could be done are the elements supported by direct observation. 2. I would evaluate the patient and recommend diagnostics and treatments based on my assessment. 3. If it was felt that the patient should be evaluated in clinic or an emergency room setting, then they would be directed there. 4. Our sessions are not being recorded and that personal health information is protected. 5. Our team would provide follow up care in person if/when the patient needs it. Patient does agree to proceed with telemedicine consultation. Patient's location: home address in [place]. Is there anyone else present for this visit: No  This visit was completed virtually using [email or web address]    Physician Location:   [address] [facility], [address] [address]    Time spent: Greater than Not billed by time    2021    TELEHEALTH EVALUATION -- Audio or Visual (During SYDAN-15 public health emergency)    HPI:    Shereen William (:  1970) has requested an audio/video evaluation for the following concern(s): Cough: Patient complains of nonproductive cough. Symptoms began several weeks ago. The cough is non-productive, without wheezing, dyspnea or hemoptysis and is aggravated by reclining position Associated symptoms include:wheezing. Patient does not have new pets. Patient does have a history of asthma. Patient does have a history of environmental allergens. Patient does not have recent travel. Patient does not have a history of smoking. Patient  does not have previous Chest X-ray. Patient does not have had a PPD done. Had COVID 23 in early January. Is still having issues with breathing. Last visit was started on FLovent, using BID. Did get some relief with Flovent, but has been wheezing and coughing more than her normal baseline asthma symptoms.        ROS Unless otherwise specified  Review of Systems - General ROS: negative for - chills, fatigue, fever, night sweats, sleep disturbance, weight gain or weight loss  Psychological ROS: negative for - anxiety, behavioral disorder, depression, hallucinations, irritability, memory difficulties, mood swings, sleep disturbances or suicidal ideation  ENT ROS: negative for - epistaxis, headaches, hearing change, nasal congestion, nasal discharge, nasal polyps, sinus pain, tinnitus, vertigo or visual changes  Hematological and Lymphatic ROS: negative for - bleeding problems, blood clots, fatigue or swollen lymph nodes  Respiratory ROS: negative for - cough, orthopnea, shortness of breath, sputum changes, tachypnea or wheezing  Cardiovascular ROS: negative for - chest pain, dyspnea on exertion, irregular heartbeat, loss of consciousness, palpitations, paroxysmal nocturnal dyspnea or rapid heart rate  Gastrointestinal ROS: negative for - abdominal pain, blood in stools, change in bowel habits, constipation, diarrhea, gas/bloating, heartburn or nausea/vomiting Musculoskeletal ROS: negative for - joint pain, joint stiffness, joint swelling or muscle, back pain, bowel or bladder incontinence  Neurological ROS: negative for - behavioral changes, confusion, dizziness, headaches, memory loss, numbness/tingling, seizures or speech problems, weakness  Dermatological ROS: negative for - dry skin, mole changes, nail changes, pruritus, rash or skin lesion changes    Prior to Visit Medications    Medication Sig Taking?  Authorizing Provider   FLOVENT  MCG/ACT inhaler inhale 1 puff by mouth and INTO THE LUNGS twice a day Yes Rosi Diaz, DO   albuterol sulfate  (90 Base) MCG/ACT inhaler inhale 2 puffs by mouth and INTO THE LUNGS every 6 hours if needed for wheezing Yes Susan Diaz, DO       Social History     Tobacco Use    Smoking status: Former Smoker     Years: 3.00     Quit date: 1/10/1988     Years since quittin.1    Smokeless tobacco: Never Used   Substance Use Topics    Alcohol use: No     Alcohol/week: 0.0 standard drinks    Drug use: No        Allergies   Allergen Reactions    Sulfa Antibiotics Other (See Comments)     Severe headache   ,   Past Medical History:   Diagnosis Date    Asthma     diagnosed at age 15    Thyroid disease     Trigeminal neuralgia    ,   Past Surgical History:   Procedure Laterality Date     SECTION      x1    TONSILLECTOMY      TUBAL LIGATION     ,   Social History     Tobacco Use    Smoking status: Former Smoker     Years: 3.00     Quit date: 1/10/1988     Years since quittin.1    Smokeless tobacco: Never Used   Substance Use Topics    Alcohol use: No     Alcohol/week: 0.0 standard drinks    Drug use: No   ,   Family History   Problem Relation Age of Onset    Other Father     Heart Failure Mother    ,   Immunization History   Administered Date(s) Administered    Hepatitis B Adult (Engerix-B) 2020, 2020  Influenza, Quadv, IM, PF (6 mo and older Fluzone, Flulaval, Fluarix, and 3 yrs and older Afluria) 10/25/2018, 12/10/2019, 11/09/2020    MMR 09/08/2020    PPD Test 08/26/2020, 09/08/2020    Pneumococcal Polysaccharide ([id number]) 12/21/2015    Tdap (Boostrix, Adacel) 06/08/2020   ,   Health Maintenance   Topic Date Due    Hepatitis C screen  1970    HIV screen  08/29/1987    Diabetes screen  08/29/2012    Cervical cancer screen  05/24/2019    Lipid screen  07/01/2025    DTaP/Tdap/Td vaccine (2 - Td) 06/08/2030    Flu vaccine  Completed    Hepatitis A vaccine  Aged Out    Hepatitis B vaccine  Aged Out    Hib vaccine  Aged Out    Meningococcal (ACWY) vaccine  Aged Out    Pneumococcal 0-64 years Vaccine  Aged Out       PHYSICAL EXAMINATION:  [ INSTRUCTIONS:  \"[x]\" Indicates a positive item  \"[]\" Indicates a negative item  -- DELETE ALL ITEMS NOT EXAMINED]  Vital Signs: (As obtained by patient/caregiver or practitioner observation)    Blood pressure-  Heart rate-    Respiratory rate-    Temperature-  Pulse oximetry-     Constitutional: [x] Appears well-developed and well-nourished [] No apparent distress      [x] Abnormal-   Mental status  [x] Alert and awake  [x] Oriented to person/place/time [x]Able to follow commands      Eyes:  EOM    [x]  Normal  [] Abnormal-  Sclera  [x]  Normal  [] Abnormal -         Discharge [x]  None visible  [] Abnormal -    HENT:   [x] Normocephalic, atraumatic.   [] Abnormal   [x] Mouth/Throat: Mucous membranes are moist.     External Ears [x] Normal  [] Abnormal-     Neck: [x] No visualized mass     Pulmonary/Chest: [x] Respiratory effort normal.  [x] No visualized signs of difficulty breathing or respiratory distress        [] Abnormal-      Musculoskeletal:   [] Normal gait with no signs of ataxia         [x] Normal range of motion of neck        [] Abnormal- Neurological:        [x] No Facial Asymmetry (Cranial nerve 7 motor function) (limited exam to video visit)          [] No gaze palsy        [] Abnormal-         Skin:        [x] No significant exanthematous lesions or discoloration noted on facial skin         [] Abnormal-            Psychiatric:       [x] Normal Affect [x] No Hallucinations        [] Abnormal-       Other pertinent observable physical exam findings-     Due to this being a TeleHealth encounter, evaluation of the following organ systems is limited: Vitals/Constitutional/EENT/Resp/CV/GI//MS/Neuro/Skin/Heme-Lymph-Imm. ASSESSMENT/PLAN:  1. Mild intermittent asthma without complication  - Consider CXR if no improvement in cough  -  INCREASE:  fluticasone (FLOVENT HFA) 110 MCG/ACT inhaler; inhale 1 puff by mouth and INTO THE LUNGS twice a day  Dispense: 12 g; Refill: 2      No follow-ups on file. An  electronic signature was used to authenticate this note. --Berl Child, DO on 2/22/2021 at 12:22 }    Pursuant to the emergency declaration under the [facility], 1135 waiver authority and the [employer] and [employer] Act, this Virtual  Visit was conducted, with patient's consent, to reduce the patient's risk of exposure to COVID-19 and provide continuity of care for an established patient. Services were provided through a video synchronous discussion virtually to substitute for in-person clinic visit.

## 2021-09-09 ENCOUNTER — TELEPHONE (OUTPATIENT)
Dept: FAMILY MEDICINE CLINIC | Age: 51
End: 2021-09-09

## 2021-09-09 NOTE — TELEPHONE ENCOUNTER
----- Message from Sal Rivera sent at 9/8/2021  4:38 PM EDT -----  Subject: Message to Provider    QUESTIONS  Information for Provider? Patient wants to schedule for her 3rd Hepatitis   B shot.   ---------------------------------------------------------------------------  --------------  CALL BACK INFO  What is the best way for the office to contact you? OK to leave message on   voicemail  Preferred Call Back Phone Number? [phone]  ---------------------------------------------------------------------------  --------------  SCRIPT ANSWERS  Relationship to Patient?  Self

## 2021-09-12 ENCOUNTER — TELEPHONE (OUTPATIENT)
Dept: FAMILY MEDICINE CLINIC | Age: 51
End: 2021-09-12

## 2021-09-12 RX ORDER — PREDNISONE 20 MG/1
20 TABLET ORAL 2 TIMES DAILY
Qty: 10 TABLET | Refills: 0 | Status: SHIPPED | OUTPATIENT
Start: 2021-09-12 | End: 2021-09-17

## 2021-09-13 ENCOUNTER — TELEPHONE (OUTPATIENT)
Dept: FAMILY MEDICINE CLINIC | Age: 51
End: 2021-09-13

## 2021-09-13 NOTE — TELEPHONE ENCOUNTER
----- Message from Dar Irene sent at 9/13/2021  1:38 PM EDT -----  Subject: Message to Provider    QUESTIONS  Information for Provider? Patient requesting to speak to Dr.Evan Katty Diaz   regarding Trigeminal neuralgia . Patient states her symptoms has not got   any better with medication and she requesting addital advice. ---------------------------------------------------------------------------  --------------  Ozzie GRAHAM  What is the best way for the office to contact you? OK to leave message on   voicemail, OK to respond with electronic message via [facility] portal (only   for patients who have registered [employer] account)  Preferred Call Back Phone Number?  [phone]  ---------------------------------------------------------------------------  --------------  SCRIPT ANSWERS  undefined

## 2021-09-15 ENCOUNTER — OFFICE VISIT (OUTPATIENT)
Dept: FAMILY MEDICINE CLINIC | Age: 51
End: 2021-09-15
Payer: COMMERCIAL

## 2021-09-15 VITALS
BODY MASS INDEX: 28.71 KG/M2 | RESPIRATION RATE: 18 BRPM | OXYGEN SATURATION: 96 % | DIASTOLIC BLOOD PRESSURE: 79 MMHG | HEIGHT: 62 IN | TEMPERATURE: 97.5 F | WEIGHT: 156 LBS | HEART RATE: 77 BPM | SYSTOLIC BLOOD PRESSURE: 118 MMHG

## 2021-09-15 DIAGNOSIS — Z12.11 SPECIAL SCREENING FOR MALIGNANT NEOPLASM OF COLON: ICD-10-CM

## 2021-09-15 DIAGNOSIS — Z00.00 ANNUAL PHYSICAL EXAM: Primary | ICD-10-CM

## 2021-09-15 DIAGNOSIS — M26.609 TMJ (TEMPOROMANDIBULAR JOINT SYNDROME): ICD-10-CM

## 2021-09-15 DIAGNOSIS — Z12.31 SCREENING MAMMOGRAM, ENCOUNTER FOR: ICD-10-CM

## 2021-09-15 PROCEDURE — 90472 IMMUNIZATION ADMIN EACH ADD: CPT | Performed by: FAMILY MEDICINE

## 2021-09-15 PROCEDURE — 99396 PREV VISIT EST AGE 40-64: CPT | Performed by: FAMILY MEDICINE

## 2021-09-15 PROCEDURE — 90746 HEPB VACCINE 3 DOSE ADULT IM: CPT | Performed by: FAMILY MEDICINE

## 2021-09-15 PROCEDURE — 90471 IMMUNIZATION ADMIN: CPT | Performed by: FAMILY MEDICINE

## 2021-09-15 PROCEDURE — 90674 CCIIV4 VAC NO PRSV 0.5 ML IM: CPT | Performed by: FAMILY MEDICINE

## 2021-09-15 RX ORDER — GABAPENTIN 300 MG/1
CAPSULE ORAL
Qty: 150 CAPSULE | Refills: 2 | Status: SHIPPED | OUTPATIENT
Start: 2021-09-15 | End: 2021-09-15

## 2021-09-15 RX ORDER — PREDNISONE 20 MG/1
20 TABLET ORAL 2 TIMES DAILY
Qty: 10 TABLET | Refills: 0 | Status: CANCELLED | OUTPATIENT
Start: 2021-09-15 | End: 2021-09-20

## 2021-09-15 RX ORDER — KETOROLAC TROMETHAMINE 10 MG/1
10 TABLET, FILM COATED ORAL EVERY 6 HOURS PRN
Qty: 20 TABLET | Refills: 0 | Status: SHIPPED
Start: 2021-09-15 | End: 2021-09-29

## 2021-09-15 RX ORDER — GABAPENTIN 300 MG/1
300 CAPSULE ORAL
COMMUNITY
End: 2021-09-15 | Stop reason: SDUPTHER

## 2021-09-15 SDOH — ECONOMIC STABILITY: FOOD INSECURITY: WITHIN THE PAST 12 MONTHS, THE FOOD YOU BOUGHT JUST DIDN'T LAST AND YOU DIDN'T HAVE MONEY TO GET MORE.: NEVER TRUE

## 2021-09-15 SDOH — ECONOMIC STABILITY: FOOD INSECURITY: WITHIN THE PAST 12 MONTHS, YOU WORRIED THAT YOUR FOOD WOULD RUN OUT BEFORE YOU GOT MONEY TO BUY MORE.: NEVER TRUE

## 2021-09-15 ASSESSMENT — PATIENT HEALTH QUESTIONNAIRE - PHQ9
SUM OF ALL RESPONSES TO PHQ QUESTIONS 1-9: 0
SUM OF ALL RESPONSES TO PHQ9 QUESTIONS 1 & 2: 0
SUM OF ALL RESPONSES TO PHQ QUESTIONS 1-9: 0
SUM OF ALL RESPONSES TO PHQ QUESTIONS 1-9: 0
1. LITTLE INTEREST OR PLEASURE IN DOING THINGS: 0
2. FEELING DOWN, DEPRESSED OR HOPELESS: 0

## 2021-09-15 ASSESSMENT — SOCIAL DETERMINANTS OF HEALTH (SDOH): HOW HARD IS IT FOR YOU TO PAY FOR THE VERY BASICS LIKE FOOD, HOUSING, MEDICAL CARE, AND HEATING?: HARD

## 2021-09-15 NOTE — PROGRESS NOTES
9/15/2021    Chief Complaint   Patient presents with    Annual Exam     physical       Screening Questions:    Exercise 30 minutes daily 5 times weekly:  Yes   Mammogram every 12 years age 36, then annually after age 48: No   Pap smear UTD:  Yes    Specialists:  No    Fecal occult blood yearly after age 50/Colonoscopy:  No   Eye exam every 24 years, yearly if diabetic:  Yes    Dental exam:  yes   BMI elevated: Body mass index is 28.53 kg/m². Soy Velez Counseled on weight loss   Tobacco use: No  . Cessation discussed        Labs:  No results found for: EAG  Lab Results   Component Value Date    LDLCALC 102 (H) 07/01/2020      CBC:   Lab Results   Component Value Date    WBC 6.4 07/01/2020    RBC 4.12 07/01/2020    HGB 12.8 07/01/2020    HCT 38.5 07/01/2020    MCV 93.4 07/01/2020    MCH 31.1 07/01/2020    MCHC 33.2 07/01/2020    RDW 12.3 07/01/2020     07/01/2020    MPV 11.1 07/01/2020         Patient's past medical, surgical, social and/or family history reviewed, updated in chart, and are non-contributory (unless otherwise stated). Medications and allergies also reviewed and updated in chart.     ROS  Review of Systems - General ROS: negative for - chills, fatigue, fever, night sweats, sleep disturbance, weight gain or weight loss  Psychological ROS: negative for - anxiety, behavioral disorder, depression, hallucinations, irritability, memory difficulties, mood swings, sleep disturbances or suicidal ideation  ENT ROS: negative for - epistaxis, headaches, hearing change, nasal congestion, nasal discharge, nasal polyps, sinus pain, tinnitus, vertigo or visual changes  Hematological and Lymphatic ROS: negative for - bleeding problems, blood clots, fatigue or swollen lymph nodes  Respiratory ROS: negative for - cough, orthopnea, shortness of breath, sputum changes, tachypnea or wheezing  Cardiovascular ROS: negative for - chest pain, dyspnea on exertion, irregular heartbeat, loss of consciousness, palpitations, paroxysmal nocturnal dyspnea or rapid heart rate  Gastrointestinal ROS: negative for - abdominal pain, blood in stools, change in bowel habits, constipation, diarrhea, gas/bloating, heartburn or nausea/vomiting  Musculoskeletal ROS: negative for - joint pain, joint stiffness, joint swelling or muscle, back pain, bowel or bladder incontinence  Neurological ROS: negative for - behavioral changes, confusion, dizziness, headaches, memory loss, numbness/tingling, seizures or speech problems, weakness  Dermatological ROS: negative for - dry skin, mole changes, nail changes, pruritus, rash or skin lesion changes    Physical Exam  /79   Pulse 77   Temp 97.5 °F (36.4 °C)   Resp 18   Ht 5' 2\" (1.575 m)   Wt 156 lb (70.8 kg)   SpO2 96%   BMI 28.53 kg/m²   Wt Readings from Last 3 Encounters:   09/15/21 156 lb (70.8 kg)   06/08/20 159 lb (72.1 kg)   12/10/19 157 lb (71.2 kg)     Temp Readings from Last 3 Encounters:   09/15/21 97.5 °F (36.4 °C)   01/05/21 97.6 °F (36.4 °C) (Temporal)   06/08/20 97.6 °F (36.4 °C)     BP Readings from Last 3 Encounters:   09/15/21 118/79   01/05/21 111/77   06/08/20 100/62     Pulse Readings from Last 3 Encounters:   09/15/21 77   01/05/21 93   06/08/20 96       General appearance: alert, well appearing, and in no distress, oriented to person, place, and time and normal appearing weight. CVS exam: normal rate, regular rhythm, normal S1, S2, no murmurs, rubs, clicks or gallops. Radial pulses 2+ bilateral.  PT/DP pulse 2+ bilat. No C/C/E    Chest: clear to auscultation, no wheezes, rales or rhonchi, symmetric air entry. Abdomen: Soft, non-tender, non-distended, positive BS in all 4 quadrants    Extremities:Dorsalis pedis pulses palpated bilaterally, no clubbing, cyanosis, edema or erythema, Sensory exam of the foot is normal, tested with the monofilament. Good pulses, no lesions or ulcers, good peripheral pulses.     SKIN: no lesions, jaundice, petechiae, pallor, cyanosis, ecchymosis    NEURO: gross motor exam normal by observation, gait normal    Mental status - alert, oriented to person, place, and time, normal mood, behavior, speech, dress, motor activity, and thought processes      Assessment/Plan  Vicenta Loo was seen today for annual exam.    Diagnoses and all orders for this visit:    Annual physical exam    TMJ (temporomandibular joint syndrome)  -     gabapentin (NEURONTIN) 300 MG capsule; 300 mg tid and 600 mg qhs  -     ketorolac (TORADOL) 10 MG tablet; Take 1 tablet by mouth every 6 hours as needed for Pain  -     Dinoicio Velazquez MD, Neurology, [place]    Special screening for malignant neoplasm of colon    Screening mammogram, encounter for  -     EWA DIGITAL SCREEN W OR WO CAD BILATERAL; Future    Other orders  -     INFLUENZA, MDCK QUADV, 2 YRS AND OLDER, IM, PF, PREFILL SYR OR SDV, 0.5ML (FLUCELVAX QUADV, PF)  -     Hep B Vaccine Adult (ENGERIX-B)        Return in about 1 year (around 9/15/2022). Call or go to ED immediately if symptoms worsen or persist.    Educational materials and/or home exercises printed for patient's review and were included in patient instructions on his/her After Visit Summary and given to patient at the end of visit. Counseled regarding above diagnosis, including possible risks and complications,  especially if left uncontrolled. Counseled regarding the possible side effects, risks, benefits and alternatives to treatment; patient and/or guardian verbalizes understanding, agrees, feels comfortable with and wishes to proceed with above treatment plan. Advised patient to call with any new medication issues, and read all Rx info from pharmacy to assure aware of all possible risks and side effects of medication before taking. Reviewed age and gender appropriate health screening exams and vaccinations.   Advised patient regarding importance of keeping up with recommended health maintenance and to schedule as soon as possible if overdue, as this is important in assessing for undiagnosed pathology, especially cancer, as well as protecting against potentially harmful/life threatening disease. Patient and/or guardian verbalizes understanding and agrees with above counseling, assessment and plan. All questions answered.

## 2021-09-16 ENCOUNTER — HOSPITAL ENCOUNTER (EMERGENCY)
Age: 51
Discharge: HOME | End: 2021-09-16
Attending: EMERGENCY MEDICINE
Payer: COMMERCIAL

## 2021-09-16 ENCOUNTER — APPOINTMENT (OUTPATIENT)
Dept: GENERAL RADIOLOGY | Age: 51
End: 2021-09-16
Payer: COMMERCIAL

## 2021-09-16 VITALS
RESPIRATION RATE: 16 BRPM | TEMPERATURE: 98.1 F | SYSTOLIC BLOOD PRESSURE: 100 MMHG | BODY MASS INDEX: 28.52 KG/M2 | HEART RATE: 70 BPM | OXYGEN SATURATION: 98 % | WEIGHT: 155 LBS | HEIGHT: 62 IN | DIASTOLIC BLOOD PRESSURE: 67 MMHG

## 2021-09-16 DIAGNOSIS — R55 NEAR SYNCOPE: Primary | ICD-10-CM

## 2021-09-16 LAB
ALBUMIN SERPL-MCNC: 3.8 G/DL (ref 3.5–5.2)
ALP BLD-CCNC: 63 U/L (ref 35–104)
ALT SERPL-CCNC: 12 U/L (ref 0–32)
ANION GAP SERPL CALCULATED.3IONS-SCNC: 10 MMOL/L (ref 7–16)
AST SERPL-CCNC: 13 U/L (ref 0–31)
BASOPHILS ABSOLUTE: 0.02 E9/L (ref 0–0.2)
BASOPHILS RELATIVE PERCENT: 0.2 % (ref 0–2)
BILIRUB SERPL-MCNC: 0.2 MG/DL (ref 0–1.2)
BUN BLDV-MCNC: 26 MG/DL (ref 6–20)
CALCIUM SERPL-MCNC: 8.8 MG/DL (ref 8.6–10.2)
CHLORIDE BLD-SCNC: 101 MMOL/L (ref 98–107)
CO2: 28 MMOL/L (ref 22–29)
CREAT SERPL-MCNC: 1 MG/DL (ref 0.5–1)
D DIMER: 228 NG/ML DDU
EOSINOPHILS ABSOLUTE: 0.08 E9/L (ref 0.05–0.5)
EOSINOPHILS RELATIVE PERCENT: 0.7 % (ref 0–6)
GFR AFRICAN AMERICAN: >60
GFR NON-AFRICAN AMERICAN: 59 ML/MIN/1.73
GLUCOSE BLD-MCNC: 101 MG/DL (ref 74–99)
HCG, URINE, POC: NEGATIVE
HCT VFR BLD CALC: 38.4 % (ref 34–48)
HEMOGLOBIN: 12.8 G/DL (ref 11.5–15.5)
IMMATURE GRANULOCYTES #: 0.04 E9/L
IMMATURE GRANULOCYTES %: 0.3 % (ref 0–5)
LYMPHOCYTES ABSOLUTE: 3.73 E9/L (ref 1.5–4)
LYMPHOCYTES RELATIVE PERCENT: 30.6 % (ref 20–42)
Lab: NORMAL
MCH RBC QN AUTO: 30.8 PG (ref 26–35)
MCHC RBC AUTO-ENTMCNC: 33.3 % (ref 32–34.5)
MCV RBC AUTO: 92.3 FL (ref 80–99.9)
MONOCYTES ABSOLUTE: 1.22 E9/L (ref 0.1–0.95)
MONOCYTES RELATIVE PERCENT: 10 % (ref 2–12)
NEGATIVE QC PASS/FAIL: NORMAL
NEUTROPHILS ABSOLUTE: 7.11 E9/L (ref 1.8–7.3)
NEUTROPHILS RELATIVE PERCENT: 58.2 % (ref 43–80)
PDW BLD-RTO: 12.5 FL (ref 11.5–15)
PLATELET # BLD: 252 E9/L (ref 130–450)
PMV BLD AUTO: 11.2 FL (ref 7–12)
POSITIVE QC PASS/FAIL: NORMAL
POTASSIUM REFLEX MAGNESIUM: 3.7 MMOL/L (ref 3.5–5)
RBC # BLD: 4.16 E12/L (ref 3.5–5.5)
SODIUM BLD-SCNC: 139 MMOL/L (ref 132–146)
TOTAL PROTEIN: 6.6 G/DL (ref 6.4–8.3)
TROPONIN, HIGH SENSITIVITY: <6 NG/L (ref 0–9)
WBC # BLD: 12.2 E9/L (ref 4.5–11.5)

## 2021-09-16 PROCEDURE — 84484 ASSAY OF TROPONIN QUANT: CPT

## 2021-09-16 PROCEDURE — 2580000003 HC RX 258: Performed by: EMERGENCY MEDICINE

## 2021-09-16 PROCEDURE — 85025 COMPLETE CBC W/AUTO DIFF WBC: CPT

## 2021-09-16 PROCEDURE — 93005 ELECTROCARDIOGRAM TRACING: CPT | Performed by: EMERGENCY MEDICINE

## 2021-09-16 PROCEDURE — 4500000002 HC ER NO CHARGE

## 2021-09-16 PROCEDURE — 71045 X-RAY EXAM CHEST 1 VIEW: CPT

## 2021-09-16 PROCEDURE — 80053 COMPREHEN METABOLIC PANEL: CPT

## 2021-09-16 PROCEDURE — 85378 FIBRIN DEGRADE SEMIQUANT: CPT

## 2021-09-16 RX ORDER — 0.9 % SODIUM CHLORIDE 0.9 %
1000 INTRAVENOUS SOLUTION INTRAVENOUS ONCE
Status: COMPLETED | OUTPATIENT
Start: 2021-09-16 | End: 2021-09-16

## 2021-09-16 RX ADMIN — SODIUM CHLORIDE 1000 ML: 9 INJECTION, SOLUTION INTRAVENOUS at 22:25

## 2021-09-17 ENCOUNTER — E-VISIT (OUTPATIENT)
Dept: INTERNAL MEDICINE CLINIC | Age: 51
End: 2021-09-17
Payer: COMMERCIAL

## 2021-09-17 DIAGNOSIS — Z12.31 SCREENING MAMMOGRAM, ENCOUNTER FOR: Primary | ICD-10-CM

## 2021-09-17 DIAGNOSIS — M26.629 ARTHRALGIA OF TEMPOROMANDIBULAR JOINT, UNSPECIFIED LATERALITY: Primary | ICD-10-CM

## 2021-09-17 LAB
EKG ATRIAL RATE: 69 BPM
EKG P AXIS: 88 DEGREES
EKG P-R INTERVAL: 156 MS
EKG Q-T INTERVAL: 362 MS
EKG QRS DURATION: 76 MS
EKG QTC CALCULATION (BAZETT): 387 MS
EKG R AXIS: 76 DEGREES
EKG T AXIS: 85 DEGREES
EKG VENTRICULAR RATE: 69 BPM

## 2021-09-17 PROCEDURE — 93010 ELECTROCARDIOGRAM REPORT: CPT | Performed by: INTERNAL MEDICINE

## 2021-09-17 PROCEDURE — 99421 OL DIG E/M SVC 5-10 MIN: CPT | Performed by: INTERNAL MEDICINE

## 2021-09-17 NOTE — ED PROVIDER NOTES
HPI:  21, Time: 9:00 PM EDT         Eugene Rodríguez is a 54 y.o. female presenting to the ED for possible syncopal episode occurring just prior to arrival.  Patient is unaware if she completely lost consciousness, but states that she did have an episode of feeling as if she was going to pass out while at the [employer] Symptoms are moderate in severity and have resolved. No exacerbating or alleviating factors. No associated symptoms of chest pain. Patient states that she had just taken Toradol for her history of trigeminal neuralgia. She states that she was just recently prescribed this medication, and it was the first time she has taken this medication. Approximately 2 minutes later she started feeling lightheaded and dizzy. She states that she set her head down on the table. She states that this time she feels tired but otherwise has no complaints. She denies headache, neck pain, focal neurologic deficits, chest pain, shortness of breath, cough, abdominal pain, nausea, vomiting, and diarrhea. No recent travel or immobilization, lower extremity edema, calf tenderness, hormone use, or history of DVT/PE. No personal cardiac history though reports a family history of heart disease. States that she is active at baseline, and she does not get exertional chest pain or shortness of breath. Review of Systems:   Pertinent positives and negatives are stated within HPI, all other systems reviewed and are negative.          --------------------------------------------- PAST HISTORY ---------------------------------------------  Past Medical History:  has a past medical history of Asthma, Thyroid disease, and Trigeminal neuralgia. Past Surgical History:  has a past surgical history that includes Tonsillectomy;  section; and Tubal ligation. Social History:  reports that she quit smoking about 33 years ago. She quit after 3.00 years of use.  She has never used smokeless tobacco. She reports that she does not drink alcohol and does not use drugs. Family History: family history includes Heart Failure in her mother; Other in her father. The patients home medications have been reviewed.     Allergies: Sulfa antibiotics    -------------------------------------------------- RESULTS -------------------------------------------------  All laboratory and radiology results have been personally reviewed by myself   LABS:  Results for orders placed or performed during the hospital encounter of 09/16/21   CBC Auto Differential   Result Value Ref Range    WBC 12.2 (H) 4.5 - 11.5 E9/L    RBC 4.16 3.50 - 5.50 E12/L    Hemoglobin 12.8 11.5 - 15.5 g/dL    Hematocrit 38.4 34.0 - 48.0 %    MCV 92.3 80.0 - 99.9 fL    MCH 30.8 26.0 - 35.0 pg    MCHC 33.3 32.0 - 34.5 %    RDW 12.5 11.5 - 15.0 fL    Platelets 239 858 - 360 E9/L    MPV 11.2 7.0 - 12.0 fL    Neutrophils % 58.2 43.0 - 80.0 %    Immature Granulocytes % 0.3 0.0 - 5.0 %    Lymphocytes % 30.6 20.0 - 42.0 %    Monocytes % 10.0 2.0 - 12.0 %    Eosinophils % 0.7 0.0 - 6.0 %    Basophils % 0.2 0.0 - 2.0 %    Neutrophils Absolute 7.11 1.80 - 7.30 E9/L    Immature Granulocytes # 0.04 E9/L    Lymphocytes Absolute 3.73 1.50 - 4.00 E9/L    Monocytes Absolute 1.22 (H) 0.10 - 0.95 E9/L    Eosinophils Absolute 0.08 0.05 - 0.50 E9/L    Basophils Absolute 0.02 0.00 - 0.20 E9/L   Comprehensive Metabolic Panel w/ Reflex to MG   Result Value Ref Range    Sodium 139 132 - 146 mmol/L    Potassium reflex Magnesium 3.7 3.5 - 5.0 mmol/L    Chloride 101 98 - 107 mmol/L    CO2 28 22 - 29 mmol/L    Anion Gap 10 7 - 16 mmol/L    Glucose 101 (H) 74 - 99 mg/dL    BUN 26 (H) 6 - 20 mg/dL    CREATININE 1.0 0.5 - 1.0 mg/dL    GFR Non-African American 59 >=60 mL/min/1.73    GFR African American >60     Calcium 8.8 8.6 - 10.2 mg/dL    Total Protein 6.6 6.4 - 8.3 g/dL    Albumin 3.8 3.5 - 5.2 g/dL    Total Bilirubin 0.2 0.0 - 1.2 mg/dL    Alkaline Phosphatase 63 35 - 104 U/L    ALT 12 0 - 32 U/L AST 13 0 - 31 U/L   Troponin   Result Value Ref Range    Troponin, High Sensitivity <6 0 - 9 ng/L   D-Dimer, Quantitative   Result Value Ref Range    D-Dimer, Quant 228 ng/mL DDU   POC Pregnancy Urine   Result Value Ref Range    HCG, Urine, POC Negative Negative    Lot Number [id number]     Positive QC Pass/Fail Pass     Negative QC Pass/Fail Pass    EKG 12 Lead   Result Value Ref Range    Ventricular Rate 69 BPM    Atrial Rate 69 BPM    P-R Interval 156 ms    QRS Duration 76 ms    Q-T Interval 362 ms    QTc Calculation (Bazett) 387 ms    P Axis 88 degrees    R Axis 76 degrees    T Axis 85 degrees       RADIOLOGY:  Interpreted by Radiologist.  XR CHEST PORTABLE   Final Result   No evidence of active cardiopulmonary pathology. ------------------------- NURSING NOTES AND VITALS REVIEWED ---------------------------   The nursing notes within the ED encounter and vital signs as below have been reviewed. BP 91/62   Pulse 69   Temp 98.1 °F (36.7 °C) (Oral)   Resp 16   Ht 5' 2\" (1.575 m)   Wt 155 lb (70.3 kg)   LMP 09/05/2021   SpO2 96%   BMI 28.35 kg/m²   Oxygen Saturation Interpretation: Normal      ---------------------------------------------------PHYSICAL EXAM--------------------------------------      Constitutional/General: Alert and oriented x3, well appearing, non toxic in NAD  Head: Normocephalic and atraumatic  Eyes: PERRL, EOMI  Mouth: Oropharynx clear, handling secretions, no trismus. No pharyngeal swelling or erythema. Uvula midline. No tonsillar enlargement or exudate. No evidence of peritonsillar abscess. No tongue swelling or lip swelling. No soft palate elevation or evidence of олег's. No stridor or trismus. No nuchal rigidity. Neck: Supple, full ROM, no nuchal rigidity, no meningeal signs  Pulmonary: Lungs clear to auscultation bilaterally, no wheezes, rales, or rhonchi. Not in respiratory distress  Cardiovascular:  Regular rate and rhythm, no murmurs, gallops, or rubs.  2+ distal pulses  Abdomen: Soft, non tender, non distended,   Extremities: Moves all extremities x 4. Warm and well perfused. No leg edema. No calf tenderness. Skin: warm and dry without rash  Neurologic: GCS 15, Facial symmetry. Speech clear. Cranial nerves intact. No focal motor or sensory deficits. No ataxia with finger-to-nose or heel-to-shin. No drift in upper or lower extremities. No nystagmus. NIH-0. Psych: Normal Affect. Behavior normal.      ------------------------------ ED COURSE/MEDICAL DECISION MAKING----------------------  Medications   0.9 % sodium chloride bolus (1,000 mLs IntraVENous New Bag 9/16/21 5525)       Medical Decision Making/ED COURSE:   Patient is a 24-year-old female presenting after a near syncopal episode. In the ED, patient was hemodynamically stable and afebrile. She was saturating well on room air. On exam, patient was well-appearing with a normal neurologic exam.  She had no leg edema or calf tenderness. She had no nuchal rigidity or meningeal signs. She has had no headaches, head trauma, or neck pain. No anticoagulation use. Intracranial hemorrhage not suspected. Patient was placed on the cardiac monitor. I interpreted findings. Rhythm - sinus. Labs and CXR obtained. Patient administered IVF. I reviewed and interpreted labs. Labs were significant for a mild leukocytosis of 12.2. Patient states that she was on steroids until yesterday likely accounting for the leukocytosis. She has no fevers or evidence of infection. D-dimer is low risk for PE. She has no risk factors for PE, no hypoxia, and no tachycardia. PE is not suspected. High-sensitivity troponin is negative. No acute EKG changes. Patient is low cardiac risk. I do not suspect ACS. Patient was observed in the ED for several hours, and she remained largely asymptomatic with no repeated episodes of syncope and no arrhythmias noted on the monitor. She has a low risk [place] syncope rule.   She is appropriate for outpatient PCP follow-up for further work-up and possible echocardiogram and Holter monitor. Supportive care measures and strict ED return precautions discussed. Patient remained hemodynamically stable throughout ED course. ED Course as of Sep 16 2330   Thu Sep 16, 2021   2232 EKG: This EKG is signed and interpreted by me. Rate: 69  Rhythm: Sinus  Interpretation: Normal sinus rhythm, normal WI interval, normal QRS, normal QT interval, no acute ST or T wave changes  Comparison: no previous EKG available        [JA]   2327 Patient is resting comfortably in no acute distress on reevaluation. States that she feels tired but she is otherwise been asymptomatic throughout her ED course. She has been maintained on the cardiac monitor, and she is had no evidence of arrhythmias. [JA]      ED Course User Index  [JA] Nimisha Kim MD           Counseling: The emergency provider has spoken with the patient and discussed todays results, in addition to providing specific details for the plan of care and counseling regarding the diagnosis and prognosis. Questions are answered at this time and they are agreeable with the plan.      --------------------------------- IMPRESSION AND DISPOSITION ---------------------------------    IMPRESSION  1. Near syncope        DISPOSITION  Disposition: Discharge to home  Patient condition is stable      NOTE: This report was transcribed using voice recognition software.  Every effort was made to ensure accuracy; however, inadvertent computerized transcription errors may be present    INimisha MD, am the primary provider of this record       Nimisha Kim MD  09/16/21 [id number]

## 2021-09-17 NOTE — ED NOTES
This RN attempted to call patient in ED lobby with no answer.  (Third attempt)     Sarah Hayward RN  09/16/21 2013

## 2021-09-17 NOTE — ED NOTES
This RN attempted to call patient in ED lobby with no answer; will attempt again in 5 minutes.      Leidy Kiser RN  09/16/21 2000

## 2021-09-17 NOTE — ED NOTES
This RN attempted to call patient in ED lobby with no answer; will attempt again in 5 minutes.  (Second attempt)     Ric Sebastian RN  09/16/21 2008

## 2021-09-20 NOTE — PROGRESS NOTES
5-10 minutes were spent on the digital evaluation and management of this patient. Diagnoses and associated orders for this visit:     Arthralgia of temporomandibular joint, unspecified laterality    I see that you were diagnsose with TMJ recently but I don't see anywhere where you were diagnose with trigeminal neuralgia. I recommend you schedule a virtual video follow up to discuss more since you have not provided enough information like if it's better and what medications you're taking and how often,. ...

## 2021-09-29 ENCOUNTER — OFFICE VISIT (OUTPATIENT)
Dept: FAMILY MEDICINE CLINIC | Age: 51
End: 2021-09-29
Payer: COMMERCIAL

## 2021-09-29 VITALS
HEART RATE: 86 BPM | WEIGHT: 161 LBS | OXYGEN SATURATION: 95 % | HEIGHT: 62 IN | SYSTOLIC BLOOD PRESSURE: 112 MMHG | BODY MASS INDEX: 29.63 KG/M2 | DIASTOLIC BLOOD PRESSURE: 76 MMHG | RESPIRATION RATE: 18 BRPM | TEMPERATURE: 97.9 F

## 2021-09-29 DIAGNOSIS — Z82.49 FAMILY HISTORY OF EARLY CAD: ICD-10-CM

## 2021-09-29 DIAGNOSIS — M26.609 TMJ (TEMPOROMANDIBULAR JOINT SYNDROME): ICD-10-CM

## 2021-09-29 DIAGNOSIS — R55 SYNCOPE, UNSPECIFIED SYNCOPE TYPE: Primary | ICD-10-CM

## 2021-09-29 PROCEDURE — G8427 DOCREV CUR MEDS BY ELIG CLIN: HCPCS | Performed by: FAMILY MEDICINE

## 2021-09-29 PROCEDURE — 99213 OFFICE O/P EST LOW 20 MIN: CPT | Performed by: FAMILY MEDICINE

## 2021-09-29 PROCEDURE — G8419 CALC BMI OUT NRM PARAM NOF/U: HCPCS | Performed by: FAMILY MEDICINE

## 2021-09-29 PROCEDURE — 1036F TOBACCO NON-USER: CPT | Performed by: FAMILY MEDICINE

## 2021-09-29 NOTE — PROGRESS NOTES
9/29/2021    Chief Complaint   Patient presents with    Allergic Reaction     ER follow up        HPI    Rodolfo Dumont is a 54 y.o. patient that presents today for:    Syncope: Patient complains of near syncope. Onset was 2 weeks ago, with resolved course since that time. Patient describes the episode as a sudden loss of consciousness without warning. Patient also has associated symptoms of  none. The patient denies abdominal pain and history of CAD. Taking culprit meds?: toradol    Patient's past medical, surgical, social and/or family history reviewed, updated in chart, and are non-contributory (unless otherwise stated). Medications and allergies also reviewed and updated in chart. ROS negative unless otherwise specified    Physical Exam  Temp Readings from Last 3 Encounters:   09/29/21 97.9 °F (36.6 °C)   09/16/21 98.1 °F (36.7 °C) (Oral)   09/15/21 97.5 °F (36.4 °C)     Wt Readings from Last 3 Encounters:   09/29/21 161 lb (73 kg)   09/16/21 155 lb (70.3 kg)   09/15/21 156 lb (70.8 kg)     BP Readings from Last 3 Encounters:   09/29/21 112/76   09/16/21 100/67   09/15/21 118/79     Pulse Readings from Last 3 Encounters:   09/29/21 86   09/16/21 70   09/15/21 77       General appearance: alert, well appearing, and in no distress, oriented to person, place, and time and normal appearing weight. CVS exam: normal rate, regular rhythm, normal S1, S2, no murmurs, rubs, clicks or gallops. Radial pulses 2+ bilateral.  PT/DP pulse 2+ bilat. No C/C/E    Chest: clear to auscultation, no wheezes, rales or rhonchi, symmetric air entry.      Abdomen: Soft, non-tender, non-distended, positive BS in all 4 quadrants    Extremities:Dorsalis pedis pulses palpated bilaterally, no clubbing, cyanosis, edema or erythema,     SKIN: no lesions, jaundice, petechiae, pallor, cyanosis, ecchymosis    NEURO: gross motor exam normal by observation, gait normal    Mental status - alert, oriented to person, place, and time, normal mood, behavior, speech, dress, motor activity, and thought processes      Assessment/Plan  Davin Thompson was seen today for allergic reaction. Diagnoses and all orders for this visit:    Syncope, unspecified syncope type  -     ECHO Complete 2D W Doppler W Color; Future  -     Holter Monitor 24 Hour; Future    TMJ (temporomandibular joint syndrome)    Family history of early CAD  -     ECHO Complete 2D W Doppler W Color; Future  -     Holter Monitor 24 Hour; Future          No follow-ups on file. Rosi Brothers, DO    Call or go to ED immediately if symptoms worsen or persist.    Educational materials and/or home exercises printed for patient's review and were included in patient instructions on his/her After Visit Summary and given to patient at the end of visit. Counseled regarding above diagnosis, including possible risks and complications,  especially if left uncontrolled. Counseled regarding the possible side effects, risks, benefits and alternatives to treatment; patient and/or guardian verbalizes understanding, agrees, feels comfortable with and wishes to proceed with above treatment plan. Advised patient to call with any new medication issues, and read all Rx info from pharmacy to assure aware of all possible risks and side effects of medication before taking. Reviewed age and gender appropriate health screening exams and vaccinations. Advised patient regarding importance of keeping up with recommended health maintenance and to schedule as soon as possible if overdue, as this is important in assessing for undiagnosed pathology, especially cancer, as well as protecting against potentially harmful/life threatening disease. Patient and/or guardian verbalizes understanding and agrees with above counseling, assessment and plan. All questions answered.

## 2021-10-18 ENCOUNTER — HOSPITAL ENCOUNTER (OUTPATIENT)
Dept: SLEEP CENTER | Age: 51
Discharge: HOME | End: 2021-10-18
Payer: COMMERCIAL

## 2021-10-18 DIAGNOSIS — Z82.49 FAMILY HISTORY OF EARLY CAD: ICD-10-CM

## 2021-10-18 DIAGNOSIS — R55 SYNCOPE, UNSPECIFIED SYNCOPE TYPE: ICD-10-CM

## 2021-10-18 DIAGNOSIS — R00.0 TACHYCARDIA: Primary | ICD-10-CM

## 2021-10-18 PROCEDURE — 93226 XTRNL ECG REC<48 HR SCAN A/R: CPT

## 2021-10-18 PROCEDURE — 93225 XTRNL ECG REC<48 HRS REC: CPT

## 2021-11-03 ENCOUNTER — TELEPHONE (OUTPATIENT)
Dept: CARDIOLOGY | Age: 51
End: 2021-11-03

## 2021-11-08 ENCOUNTER — TELEPHONE (OUTPATIENT)
Dept: CARDIOLOGY | Age: 51
End: 2021-11-08

## 2021-11-12 ENCOUNTER — TELEPHONE (OUTPATIENT)
Dept: CARDIOLOGY | Age: 51
End: 2021-11-12

## 2021-11-12 NOTE — TELEPHONE ENCOUNTER
PATIENT RETURNING OUR CALL TO SCHEDULE ECHO FOR 12-06-21. REVIEWED COVID CHECKLIST WITH PATIENT.     Electronically signed by Jas Moreira on 11/12/2021 at 7:57 AM

## 2021-11-15 ENCOUNTER — HOSPITAL ENCOUNTER (OUTPATIENT)
Dept: GENERAL RADIOLOGY | Age: 51
Discharge: HOME | End: 2021-11-17
Payer: COMMERCIAL

## 2021-11-15 DIAGNOSIS — Z12.31 SCREENING MAMMOGRAM, ENCOUNTER FOR: ICD-10-CM

## 2021-11-15 PROCEDURE — 77063 BREAST TOMOSYNTHESIS BI: CPT

## 2021-11-28 DIAGNOSIS — J45.20 MILD INTERMITTENT ASTHMA WITHOUT COMPLICATION: ICD-10-CM

## 2021-11-29 RX ORDER — ALBUTEROL SULFATE 90 UG/1
2 AEROSOL, METERED RESPIRATORY (INHALATION) EVERY 6 HOURS PRN
Qty: 8.5 G | Refills: 5 | Status: SHIPPED | OUTPATIENT
Start: 2021-11-29

## 2021-12-06 ENCOUNTER — HOSPITAL ENCOUNTER (OUTPATIENT)
Dept: CARDIOLOGY | Age: 51
Discharge: HOME | End: 2021-12-06
Payer: COMMERCIAL

## 2021-12-06 DIAGNOSIS — Z82.49 FAMILY HISTORY OF EARLY CAD: ICD-10-CM

## 2021-12-06 DIAGNOSIS — R55 SYNCOPE, UNSPECIFIED SYNCOPE TYPE: ICD-10-CM

## 2021-12-06 LAB
LV EF: 63 %
LVEF MODALITY: NORMAL

## 2021-12-06 PROCEDURE — 93306 TTE W/DOPPLER COMPLETE: CPT | Performed by: PSYCHIATRY & NEUROLOGY

## 2022-01-03 ENCOUNTER — NURSE TRIAGE (OUTPATIENT)
Dept: OTHER | Facility: CLINIC | Age: 52
End: 2022-01-03

## 2022-01-03 NOTE — TELEPHONE ENCOUNTER
Received call from [place] at [facility] with Red Flag Complaint. Subjective: Caller states \" a little cough. 1 week at least. Seems last couple days worse. I have asthma. im fully vaccinated haven't had the booster yet. \"   Hx asthma    Current Symptoms: cough was dry, last day or two productive. Use albuterol nebulizer last two nights. Tired. Worse at night, better as day goes on, worse again at night. Pt feels tightness/achey in chest, patient reports shortness of breath at rest a little. Wheezing breathing in and out. PND    Onset: 1 week at least, pat couple days worsening     Associated Symptoms: increased sleepiness, increased wakefulness    Pain Severity: denies pains other than discomfort from coughing     Temperature: has not checked     What has been tried: nebulizer albuterol. vicks     LMP: 2 weeks before for jad  Pregnant: No    Recommended disposition: see HCP within 4 hours- given office closed plan UC tonight     Care advice provided, patient verbalizes understanding; denies any other questions or concerns; instructed to call back for any new or worsening symptoms. Patient/caller proceeding to nearest [facility]      Attention Provider: Thank you for allowing me to participate in the care of your patient. The patient was connected to triage in response to information provided to the ECC/PSC. Please do not respond through this encounter as the response is not directed to a shared pool.           Reason for Disposition   [1] MILD difficulty breathing (e.g., minimal/no SOB at rest, SOB with walking, pulse <100) AND [2] NEW-onset or WORSE than normal     Pt reports shortness of breath at rest at times mildly , talking in full complete sentences on phone    Protocols used: BREATHING DIFFICULTY-ADULT-AH

## 2022-01-13 ENCOUNTER — OFFICE VISIT (OUTPATIENT)
Dept: FAMILY MEDICINE CLINIC | Age: 52
End: 2022-01-13
Payer: COMMERCIAL

## 2022-01-13 VITALS
OXYGEN SATURATION: 98 % | HEART RATE: 98 BPM | DIASTOLIC BLOOD PRESSURE: 80 MMHG | HEIGHT: 62 IN | TEMPERATURE: 98.5 F | SYSTOLIC BLOOD PRESSURE: 122 MMHG | BODY MASS INDEX: 27.97 KG/M2 | WEIGHT: 152 LBS | RESPIRATION RATE: 18 BRPM

## 2022-01-13 DIAGNOSIS — J45.20 MILD INTERMITTENT ASTHMA WITHOUT COMPLICATION: ICD-10-CM

## 2022-01-13 PROCEDURE — 99213 OFFICE O/P EST LOW 20 MIN: CPT | Performed by: FAMILY MEDICINE

## 2022-01-13 PROCEDURE — G8482 FLU IMMUNIZE ORDER/ADMIN: HCPCS | Performed by: FAMILY MEDICINE

## 2022-01-13 PROCEDURE — G8419 CALC BMI OUT NRM PARAM NOF/U: HCPCS | Performed by: FAMILY MEDICINE

## 2022-01-13 PROCEDURE — G8427 DOCREV CUR MEDS BY ELIG CLIN: HCPCS | Performed by: FAMILY MEDICINE

## 2022-01-13 PROCEDURE — 1036F TOBACCO NON-USER: CPT | Performed by: FAMILY MEDICINE

## 2022-01-13 RX ORDER — ALBUTEROL SULFATE 90 UG/1
2 AEROSOL, METERED RESPIRATORY (INHALATION) EVERY 6 HOURS PRN
Qty: 3 G | Refills: 5 | Status: CANCELLED | OUTPATIENT
Start: 2022-01-13

## 2022-01-13 RX ORDER — FLUTICASONE PROPIONATE 220 UG/1
2 AEROSOL, METERED RESPIRATORY (INHALATION) 2 TIMES DAILY
Qty: 3 EACH | Refills: 5 | Status: SHIPPED | OUTPATIENT
Start: 2022-01-13

## 2022-01-13 NOTE — PROGRESS NOTES
activity, and thought processes      Assessment/Plan  Moise Andrew was seen today for asthma. Diagnoses and all orders for this visit:    Mild intermittent asthma without complication  -     fluticasone (FLOVENT HFA) 220 MCG/ACT inhaler; Inhale 2 puffs into the lungs 2 times daily STOP prior prescription. New prescription reflects dose increase          No follow-ups on file. Rosi Brothers, DO    Call or go to ED immediately if symptoms worsen or persist.    Educational materials and/or home exercises printed for patient's review and were included in patient instructions on his/her After Visit Summary and given to patient at the end of visit. Counseled regarding above diagnosis, including possible risks and complications,  especially if left uncontrolled. Counseled regarding the possible side effects, risks, benefits and alternatives to treatment; patient and/or guardian verbalizes understanding, agrees, feels comfortable with and wishes to proceed with above treatment plan. Advised patient to call with any new medication issues, and read all Rx info from pharmacy to assure aware of all possible risks and side effects of medication before taking. Reviewed age and gender appropriate health screening exams and vaccinations. Advised patient regarding importance of keeping up with recommended health maintenance and to schedule as soon as possible if overdue, as this is important in assessing for undiagnosed pathology, especially cancer, as well as protecting against potentially harmful/life threatening disease. Patient and/or guardian verbalizes understanding and agrees with above counseling, assessment and plan. All questions answered.

## 2022-05-27 ENCOUNTER — OFFICE VISIT (OUTPATIENT)
Dept: PRIMARY CARE CLINIC | Age: 52
End: 2022-05-27
Payer: COMMERCIAL

## 2022-05-27 VITALS
SYSTOLIC BLOOD PRESSURE: 116 MMHG | TEMPERATURE: 97.9 F | OXYGEN SATURATION: 98 % | HEART RATE: 94 BPM | DIASTOLIC BLOOD PRESSURE: 81 MMHG | RESPIRATION RATE: 16 BRPM

## 2022-05-27 DIAGNOSIS — J02.9 SORE THROAT: ICD-10-CM

## 2022-05-27 DIAGNOSIS — J01.90 ACUTE BACTERIAL SINUSITIS: ICD-10-CM

## 2022-05-27 DIAGNOSIS — B96.89 ACUTE BACTERIAL SINUSITIS: ICD-10-CM

## 2022-05-27 DIAGNOSIS — R05.9 COUGH: ICD-10-CM

## 2022-05-27 LAB
Lab: NORMAL
PERFORMING INSTRUMENT: NORMAL
QC PASS/FAIL: NORMAL
S PYO AG THROAT QL: NORMAL
SARS-COV-2, POC: NORMAL

## 2022-05-27 PROCEDURE — 87426 SARSCOV CORONAVIRUS AG IA: CPT | Performed by: NURSE PRACTITIONER

## 2022-05-27 PROCEDURE — 87880 STREP A ASSAY W/OPTIC: CPT | Performed by: NURSE PRACTITIONER

## 2022-05-27 PROCEDURE — 1036F TOBACCO NON-USER: CPT | Performed by: NURSE PRACTITIONER

## 2022-05-27 PROCEDURE — 99213 OFFICE O/P EST LOW 20 MIN: CPT | Performed by: NURSE PRACTITIONER

## 2022-05-27 PROCEDURE — G8419 CALC BMI OUT NRM PARAM NOF/U: HCPCS | Performed by: NURSE PRACTITIONER

## 2022-05-27 PROCEDURE — G8427 DOCREV CUR MEDS BY ELIG CLIN: HCPCS | Performed by: NURSE PRACTITIONER

## 2022-05-27 RX ORDER — DOXYCYCLINE HYCLATE 100 MG
100 TABLET ORAL 2 TIMES DAILY
Qty: 20 TABLET | Refills: 0 | Status: SHIPPED | OUTPATIENT
Start: 2022-05-27 | End: 2022-06-06

## 2022-05-27 RX ORDER — BROMPHENIRAMINE MALEATE, PSEUDOEPHEDRINE HYDROCHLORIDE, AND DEXTROMETHORPHAN HYDROBROMIDE 2; 30; 10 MG/5ML; MG/5ML; MG/5ML
5 SYRUP ORAL 4 TIMES DAILY PRN
Qty: 118 ML | Refills: 0 | Status: SHIPPED
Start: 2022-05-27 | End: 2022-10-18

## 2022-05-27 RX ORDER — PREDNISONE 10 MG/1
10 TABLET ORAL 2 TIMES DAILY
Qty: 10 TABLET | Refills: 0 | Status: SHIPPED | OUTPATIENT
Start: 2022-05-27 | End: 2022-06-01

## 2022-05-27 ASSESSMENT — PATIENT HEALTH QUESTIONNAIRE - PHQ9
SUM OF ALL RESPONSES TO PHQ QUESTIONS 1-9: 0
SUM OF ALL RESPONSES TO PHQ QUESTIONS 1-9: 0
SUM OF ALL RESPONSES TO PHQ9 QUESTIONS 1 & 2: 0
1. LITTLE INTEREST OR PLEASURE IN DOING THINGS: 0
SUM OF ALL RESPONSES TO PHQ QUESTIONS 1-9: 0
2. FEELING DOWN, DEPRESSED OR HOPELESS: 0
SUM OF ALL RESPONSES TO PHQ QUESTIONS 1-9: 0

## 2022-05-27 NOTE — PROGRESS NOTES
Chief Complaint:   Cough, Head Congestion, Pharyngitis, and Shortness of Breath      History of Present Illness   Source of history provided by:  patientAngelita Lance is a 54 y.o. old female with a past medical history of:   Past Medical History:   Diagnosis Date    Asthma     diagnosed at age 15    Thyroid disease     Trigeminal neuralgia         Pt presents to the [facility] care with a cough/congestion/sore throat/SOB for the past few days. States the cough is  productive. No fever noted. Denies any N/V/D, abdominal pain, CP, dizziness, or lethargy. Pt has a h/o Asthma       ROS    Unless otherwise stated in this report or unable to obtain because of the patient's clinical or mental status as evidenced by the medical record, this patients's positive and negative responses for Review of Systems, constitutional, psych, eyes, ENT, cardiovascular, respiratory, gastrointestinal, neurological, genitourinary, musculoskeletal, integument systems and systems related to the presenting problem are either stated in the preceding or were not pertinent or were negative for the symptoms and/or complaints related to the medical problem. Past Surgical History:  has a past surgical history that includes Tonsillectomy;  section; and Tubal ligation. Social History:  reports that she quit smoking about 34 years ago. She quit after 3.00 years of use. She has never used smokeless tobacco. She reports that she does not drink alcohol and does not use drugs. Family History: family history includes Heart Failure in her mother; Other in her father. Allergies: Sulfa antibiotics    Physical Exam         VS:  /81 (Site: Right Upper Arm, Position: Sitting, Cuff Size: Medium Adult)   Pulse 94   Temp 97.9 °F (36.6 °C) (Temporal)   Resp 16   SpO2 98%    Oxygen Saturation Interpretation: Normal.    Constitutional:  Alert, development consistent with age. Ears:  External Ears: Normal bilateral pinna. TM's & External Canals: TM's normal bilaterally without perforation. Canals without erythema or drainage. Nose:   There is no obvious septal defect. Moderate redness/edema  Mouth:  Moist bucca mucosa and normal tongue. Throat: Mild posterior pharyngeal erythema without exudates or lesions. Neck:  Supple. There is no obvious adenopathy or neck tenderness. Lungs:   Breath sounds: Normal chest expansion and breath sounds noted throughout. No wheezes, rales, or rhonchi noted. Heart:  Regular rate and rhythm, normal heart sounds, without pathological murmurs, ectopy, gallops, or rubs. Skin:  Normal turgor. Warm, dry, without visible rash. Neurological:  Oriented. Motor functions intact. Lab / Imaging Results   (All laboratory and radiology results have been personally reviewed by myself)  Labs:  Results for orders placed or performed in visit on 05/27/22   POCT rapid strep A   Result Value Ref Range    Strep A Ag None Detected None Detected   POCT COVID-19, Antigen   Result Value Ref Range    SARS-COV-2, POC Not-Detected Not Detected    Lot Number [id number]     QC Pass/Fail pass     Performing Instrument BD Veritor        Imaging: All Radiology results interpreted by Radiologist unless otherwise noted. No orders to display         Assessment / Plan     Impression(s):  1. Acute bacterial sinusitis    2. Cough    3. Sore throat      Disposition:  Disposition: Advised Covid and Influenza A/B are negative. Start Doxycycline, Prednisone and Bromfed as ordered. Continue inhalers. Increase water intake.  Return to walk in care with any worsening or concerning medical issues

## 2022-10-18 ENCOUNTER — OFFICE VISIT (OUTPATIENT)
Dept: PRIMARY CARE CLINIC | Age: 52
End: 2022-10-18
Payer: COMMERCIAL

## 2022-10-18 VITALS
HEIGHT: 62 IN | BODY MASS INDEX: 28.71 KG/M2 | SYSTOLIC BLOOD PRESSURE: 114 MMHG | DIASTOLIC BLOOD PRESSURE: 80 MMHG | HEART RATE: 84 BPM | RESPIRATION RATE: 16 BRPM | WEIGHT: 156 LBS | OXYGEN SATURATION: 98 % | TEMPERATURE: 97.6 F

## 2022-10-18 DIAGNOSIS — J32.9 SINOBRONCHITIS: Primary | ICD-10-CM

## 2022-10-18 DIAGNOSIS — J40 SINOBRONCHITIS: Primary | ICD-10-CM

## 2022-10-18 LAB
Lab: NORMAL
PERFORMING INSTRUMENT: NORMAL
QC PASS/FAIL: NORMAL
SARS-COV-2, POC: NORMAL

## 2022-10-18 PROCEDURE — 1036F TOBACCO NON-USER: CPT | Performed by: NURSE PRACTITIONER

## 2022-10-18 PROCEDURE — G8419 CALC BMI OUT NRM PARAM NOF/U: HCPCS | Performed by: NURSE PRACTITIONER

## 2022-10-18 PROCEDURE — 99213 OFFICE O/P EST LOW 20 MIN: CPT | Performed by: NURSE PRACTITIONER

## 2022-10-18 PROCEDURE — 87426 SARSCOV CORONAVIRUS AG IA: CPT | Performed by: NURSE PRACTITIONER

## 2022-10-18 PROCEDURE — 3017F COLORECTAL CA SCREEN DOC REV: CPT | Performed by: NURSE PRACTITIONER

## 2022-10-18 PROCEDURE — G8484 FLU IMMUNIZE NO ADMIN: HCPCS | Performed by: NURSE PRACTITIONER

## 2022-10-18 PROCEDURE — G8427 DOCREV CUR MEDS BY ELIG CLIN: HCPCS | Performed by: NURSE PRACTITIONER

## 2022-10-18 RX ORDER — PREDNISONE 10 MG/1
10 TABLET ORAL 2 TIMES DAILY
Qty: 10 TABLET | Refills: 0 | Status: SHIPPED | OUTPATIENT
Start: 2022-10-18 | End: 2022-10-23

## 2022-10-18 RX ORDER — DOXYCYCLINE HYCLATE 100 MG
100 TABLET ORAL 2 TIMES DAILY
Qty: 20 TABLET | Refills: 0 | Status: SHIPPED | OUTPATIENT
Start: 2022-10-18 | End: 2022-10-28

## 2022-10-18 RX ORDER — BROMPHENIRAMINE MALEATE, PSEUDOEPHEDRINE HYDROCHLORIDE, AND DEXTROMETHORPHAN HYDROBROMIDE 2; 30; 10 MG/5ML; MG/5ML; MG/5ML
5 SYRUP ORAL 4 TIMES DAILY PRN
Qty: 118 ML | Refills: 0 | Status: SHIPPED | OUTPATIENT
Start: 2022-10-18

## 2022-10-18 NOTE — PROGRESS NOTES
Chief Complaint:   Sinusitis, Congestion, Cough, and Wheezing      History of Present Illness   Source of history provided by:  patient. Howard Tyson is a 50 y.o. old female with a past medical history of:   Past Medical History:   Diagnosis Date    Asthma     diagnosed at age 15    Thyroid disease     Trigeminal neuralgia        Pt  presents to [facility] care with sinus pressure/congestion/ cough /wheezing for the past few weeks. States the cough is non productive. No fever noted. Denies any N/V/D, abdominal pain, CP, progressive SOB, dizziness, or lethargy. Pt has a h/o Asthma and has needed to increase use of Flovent        ROS    Unless otherwise stated in this report or unable to obtain because of the patient's clinical or mental status as evidenced by the medical record, this patients's positive and negative responses for Review of Systems, constitutional, psych, eyes, ENT, cardiovascular, respiratory, gastrointestinal, neurological, genitourinary, musculoskeletal, integument systems and systems related to the presenting problem are either stated in the preceding or were not pertinent or were negative for the symptoms and/or complaints related to the medical problem. Past Surgical History:  has a past surgical history that includes Tonsillectomy;  section; and Tubal ligation. Social History:  reports that she quit smoking about 34 years ago. Her smoking use included cigarettes. She has never used smokeless tobacco. She reports that she does not drink alcohol and does not use drugs. Family History: family history includes Heart Failure in her mother; Other in her father. Allergies: Sulfa antibiotics    Physical Exam         VS:  /80   Pulse 84   Temp 97.6 °F (36.4 °C)   Resp 16   Ht 5' 2\" (1.575 m)   Wt 156 lb (70.8 kg)   SpO2 98%   BMI 28.53 kg/m²    Oxygen Saturation Interpretation: Normal.    Constitutional:  Alert, development consistent with age.   Ears:  External Ears: Normal bilateral pinna. TM's & External Canals: TM's normal bilaterally without perforation. Canals without erythema or drainage. Nose:   There is no obvious septal defect. Moderate redness/edema  Mouth:  Moist bucca mucosa and normal tongue. Throat: Mild posterior pharyngeal erythema without exudates or lesions. Neck:  Supple. There is no obvious adenopathy or neck tenderness. Lungs:   Breath sounds: Normal chest expansion and breath sounds: mildly decreased bilaterally  Heart:  Regular rate and rhythm, normal heart sounds, without pathological murmurs, ectopy, gallops, or rubs. Skin:  Normal turgor. Warm, dry, without visible rash. Neurological:  Oriented. Motor functions intact. Lab / Imaging Results   (All laboratory and radiology results have been personally reviewed by myself)  Labs:  Results for orders placed or performed in visit on 10/18/22   POCT COVID-19, Antigen   Result Value Ref Range    SARS-COV-2, POC Not-Detected Not Detected    Lot Number [id number]     QC Pass/Fail pass     Performing Instrument BD Veritor        Imaging: All Radiology results interpreted by Radiologist unless otherwise noted. No orders to display         Assessment / Plan     Impression(s):  1. Sinobronchitis      Disposition:  Disposition: Advised Covid test is negative, start Doxycycline, Prednisone and Bromfed, stay well hydrated, rcontinue Flovent inhaler only as ordered.  Return to walk in care w/any worsening or concerning medical issues

## 2022-10-24 DIAGNOSIS — Z12.31 ENCOUNTER FOR SCREENING MAMMOGRAM FOR MALIGNANT NEOPLASM OF BREAST: Primary | ICD-10-CM

## 2022-11-18 ENCOUNTER — HOSPITAL ENCOUNTER (OUTPATIENT)
Dept: GENERAL RADIOLOGY | Age: 52
Discharge: HOME | End: 2022-11-20
Payer: COMMERCIAL

## 2022-11-18 VITALS — HEIGHT: 62 IN | WEIGHT: 150 LBS | BODY MASS INDEX: 27.6 KG/M2

## 2022-11-18 DIAGNOSIS — Z12.31 ENCOUNTER FOR SCREENING MAMMOGRAM FOR MALIGNANT NEOPLASM OF BREAST: ICD-10-CM

## 2022-11-18 PROCEDURE — 77067 SCR MAMMO BI INCL CAD: CPT

## 2023-01-04 DIAGNOSIS — J45.20 MILD INTERMITTENT ASTHMA WITHOUT COMPLICATION: ICD-10-CM

## 2023-01-05 DIAGNOSIS — J45.20 MILD INTERMITTENT ASTHMA WITHOUT COMPLICATION: ICD-10-CM

## 2023-01-05 RX ORDER — ALBUTEROL SULFATE 90 UG/1
AEROSOL, METERED RESPIRATORY (INHALATION)
Qty: 18 G | Refills: 5 | Status: SHIPPED | OUTPATIENT
Start: 2023-01-05

## 2023-01-05 RX ORDER — ALBUTEROL SULFATE 90 UG/1
2 AEROSOL, METERED RESPIRATORY (INHALATION) EVERY 6 HOURS PRN
Qty: 8.5 G | Refills: 5 | Status: SHIPPED | OUTPATIENT
Start: 2023-01-05

## 2023-10-05 LAB
PAP SMEAR, EXTERNAL: NORMAL
PAP SMEAR, EXTERNAL: NORMAL

## 2023-11-13 ENCOUNTER — OFFICE VISIT (OUTPATIENT)
Dept: FAMILY MEDICINE CLINIC | Age: 53
End: 2023-11-13
Payer: COMMERCIAL

## 2023-11-13 VITALS
OXYGEN SATURATION: 98 % | WEIGHT: 141.3 LBS | HEART RATE: 92 BPM | HEIGHT: 62 IN | TEMPERATURE: 97.5 F | RESPIRATION RATE: 16 BRPM | SYSTOLIC BLOOD PRESSURE: 111 MMHG | BODY MASS INDEX: 26 KG/M2 | DIASTOLIC BLOOD PRESSURE: 74 MMHG

## 2023-11-13 DIAGNOSIS — J34.89 NOSE IRRITATION: Primary | ICD-10-CM

## 2023-11-13 PROCEDURE — 1036F TOBACCO NON-USER: CPT | Performed by: FAMILY MEDICINE

## 2023-11-13 PROCEDURE — G8482 FLU IMMUNIZE ORDER/ADMIN: HCPCS | Performed by: FAMILY MEDICINE

## 2023-11-13 PROCEDURE — G8419 CALC BMI OUT NRM PARAM NOF/U: HCPCS | Performed by: FAMILY MEDICINE

## 2023-11-13 PROCEDURE — 90674 CCIIV4 VAC NO PRSV 0.5 ML IM: CPT | Performed by: FAMILY MEDICINE

## 2023-11-13 PROCEDURE — 3017F COLORECTAL CA SCREEN DOC REV: CPT | Performed by: FAMILY MEDICINE

## 2023-11-13 PROCEDURE — 90471 IMMUNIZATION ADMIN: CPT | Performed by: FAMILY MEDICINE

## 2023-11-13 PROCEDURE — 99213 OFFICE O/P EST LOW 20 MIN: CPT | Performed by: FAMILY MEDICINE

## 2023-11-13 PROCEDURE — G8427 DOCREV CUR MEDS BY ELIG CLIN: HCPCS | Performed by: FAMILY MEDICINE

## 2023-11-20 ENCOUNTER — HOSPITAL ENCOUNTER (OUTPATIENT)
Dept: GENERAL RADIOLOGY | Age: 53
Discharge: HOME | End: 2023-11-22
Payer: COMMERCIAL

## 2023-11-20 VITALS — WEIGHT: 141 LBS | HEIGHT: 62 IN | BODY MASS INDEX: 25.95 KG/M2

## 2023-11-20 DIAGNOSIS — Z12.31 ENCOUNTER FOR SCREENING MAMMOGRAM FOR MALIGNANT NEOPLASM OF BREAST: ICD-10-CM

## 2023-11-20 PROCEDURE — 77063 BREAST TOMOSYNTHESIS BI: CPT

## 2023-12-14 ENCOUNTER — OFFICE VISIT (OUTPATIENT)
Dept: FAMILY MEDICINE CLINIC | Age: 53
End: 2023-12-14
Payer: COMMERCIAL

## 2023-12-14 VITALS
SYSTOLIC BLOOD PRESSURE: 100 MMHG | HEART RATE: 86 BPM | WEIGHT: 134 LBS | DIASTOLIC BLOOD PRESSURE: 65 MMHG | TEMPERATURE: 97.5 F | HEIGHT: 62 IN | OXYGEN SATURATION: 98 % | BODY MASS INDEX: 24.66 KG/M2 | RESPIRATION RATE: 16 BRPM

## 2023-12-14 DIAGNOSIS — J06.9 VIRAL URI: Primary | ICD-10-CM

## 2023-12-14 PROCEDURE — 1036F TOBACCO NON-USER: CPT | Performed by: FAMILY MEDICINE

## 2023-12-14 PROCEDURE — G8427 DOCREV CUR MEDS BY ELIG CLIN: HCPCS | Performed by: FAMILY MEDICINE

## 2023-12-14 PROCEDURE — G8420 CALC BMI NORM PARAMETERS: HCPCS | Performed by: FAMILY MEDICINE

## 2023-12-14 PROCEDURE — 99213 OFFICE O/P EST LOW 20 MIN: CPT | Performed by: FAMILY MEDICINE

## 2023-12-14 PROCEDURE — G8482 FLU IMMUNIZE ORDER/ADMIN: HCPCS | Performed by: FAMILY MEDICINE

## 2023-12-14 PROCEDURE — 3017F COLORECTAL CA SCREEN DOC REV: CPT | Performed by: FAMILY MEDICINE

## 2024-02-02 ENCOUNTER — TELEPHONE (OUTPATIENT)
Dept: FAMILY MEDICINE CLINIC | Age: 54
End: 2024-02-02

## 2024-02-02 NOTE — TELEPHONE ENCOUNTER
Pt called in and was wondering about a referral for a general surgeon that you both discussed about for her left hip. She stated that Dr Hernandez had removed something years ago and now she has one on her left hip     Please advise.

## 2024-02-26 ENCOUNTER — OFFICE VISIT (OUTPATIENT)
Dept: PRIMARY CARE CLINIC | Age: 54
End: 2024-02-26
Payer: COMMERCIAL

## 2024-02-26 VITALS — TEMPERATURE: 98.6 F | SYSTOLIC BLOOD PRESSURE: 109 MMHG | HEART RATE: 103 BPM | DIASTOLIC BLOOD PRESSURE: 77 MMHG

## 2024-02-26 DIAGNOSIS — K04.7 DENTAL INFECTION: Primary | ICD-10-CM

## 2024-02-26 PROCEDURE — G8427 DOCREV CUR MEDS BY ELIG CLIN: HCPCS | Performed by: NURSE PRACTITIONER

## 2024-02-26 PROCEDURE — 99213 OFFICE O/P EST LOW 20 MIN: CPT | Performed by: NURSE PRACTITIONER

## 2024-02-26 PROCEDURE — 1036F TOBACCO NON-USER: CPT | Performed by: NURSE PRACTITIONER

## 2024-02-26 PROCEDURE — 3017F COLORECTAL CA SCREEN DOC REV: CPT | Performed by: NURSE PRACTITIONER

## 2024-02-26 PROCEDURE — G8420 CALC BMI NORM PARAMETERS: HCPCS | Performed by: NURSE PRACTITIONER

## 2024-02-26 PROCEDURE — G8482 FLU IMMUNIZE ORDER/ADMIN: HCPCS | Performed by: NURSE PRACTITIONER

## 2024-02-26 RX ORDER — PREDNISONE 10 MG/1
10 TABLET ORAL 2 TIMES DAILY
Qty: 10 TABLET | Refills: 0 | Status: SHIPPED | OUTPATIENT
Start: 2024-02-26 | End: 2024-03-02

## 2024-02-26 RX ORDER — AMOXICILLIN AND CLAVULANATE POTASSIUM 875; 125 MG/1; MG/1
1 TABLET, FILM COATED ORAL 2 TIMES DAILY
Qty: 20 TABLET | Refills: 0 | Status: SHIPPED | OUTPATIENT
Start: 2024-02-26 | End: 2024-03-07

## 2024-02-26 NOTE — PROGRESS NOTES
Chief Complaint:   Facial Pain (Left side )      History of Present Illness   Source of history provided by:  patient.      Angeli Livingston is a 53 y.o. old female with a past medical history of:   Past Medical History:   Diagnosis Date    Asthma     diagnosed at age 17    Thyroid disease     Trigeminal neuralgia         Pt presents to the [facility] [place] with c/o left sided facial pain for the past few days.  Pt does have a h/o Trigeminal Neuralgia that mainly affects right side of face. Pt is concerned about a possible dental issue. Pt does have appointment scheduled with Dentist.  No fever noted.   Denies any N/V/D, CP, difficulty swallowing, progressive SOB, dizziness, or lethargy.           ROS    Unless otherwise stated in this report or unable to obtain because of the patient's clinical or mental status as evidenced by the medical record, this patients's positive and negative responses for Review of Systems, constitutional, psych, eyes, ENT, cardiovascular, respiratory, gastrointestinal, neurological, genitourinary, musculoskeletal, integument systems and systems related to the presenting problem are either stated in the preceding or were not pertinent or were negative for the symptoms and/or complaints related to the medical problem.    Past Surgical History:  has a past surgical history that includes Tonsillectomy;  section; and Tubal ligation.  Social History:  reports that she quit smoking about 35 years ago. Her smoking use included cigarettes. She started smoking about 38 years ago. She has a 1.5 pack-year smoking history. She has never used smokeless tobacco. She reports that she does not drink alcohol and does not use drugs.  Family History: family history includes Breast Cancer in her paternal aunt and paternal grandmother; Breast Cancer (age of onset: 32) in her paternal great grandmother; Cancer in her paternal aunt and paternal grandfather; Diabetes in her father, maternal grandmother, and

## 2024-03-04 ENCOUNTER — OFFICE VISIT (OUTPATIENT)
Dept: FAMILY MEDICINE CLINIC | Age: 54
End: 2024-03-04
Payer: COMMERCIAL

## 2024-03-04 VITALS
TEMPERATURE: 98.4 F | BODY MASS INDEX: 24.33 KG/M2 | SYSTOLIC BLOOD PRESSURE: 100 MMHG | HEART RATE: 87 BPM | DIASTOLIC BLOOD PRESSURE: 68 MMHG | OXYGEN SATURATION: 98 % | RESPIRATION RATE: 16 BRPM | WEIGHT: 132.2 LBS | HEIGHT: 62 IN

## 2024-03-04 DIAGNOSIS — R10.2 CHRONIC FEMALE PELVIC PAIN: Primary | ICD-10-CM

## 2024-03-04 DIAGNOSIS — Z12.11 SPECIAL SCREENING FOR MALIGNANT NEOPLASM OF COLON: ICD-10-CM

## 2024-03-04 DIAGNOSIS — G89.29 CHRONIC FEMALE PELVIC PAIN: Primary | ICD-10-CM

## 2024-03-04 DIAGNOSIS — J45.20 MILD INTERMITTENT ASTHMA WITHOUT COMPLICATION: ICD-10-CM

## 2024-03-04 PROCEDURE — 99213 OFFICE O/P EST LOW 20 MIN: CPT | Performed by: FAMILY MEDICINE

## 2024-03-04 PROCEDURE — G8427 DOCREV CUR MEDS BY ELIG CLIN: HCPCS | Performed by: FAMILY MEDICINE

## 2024-03-04 PROCEDURE — G8482 FLU IMMUNIZE ORDER/ADMIN: HCPCS | Performed by: FAMILY MEDICINE

## 2024-03-04 PROCEDURE — 1036F TOBACCO NON-USER: CPT | Performed by: FAMILY MEDICINE

## 2024-03-04 PROCEDURE — 3017F COLORECTAL CA SCREEN DOC REV: CPT | Performed by: FAMILY MEDICINE

## 2024-03-04 PROCEDURE — G8420 CALC BMI NORM PARAMETERS: HCPCS | Performed by: FAMILY MEDICINE

## 2024-03-04 RX ORDER — FLUTICASONE PROPIONATE 220 UG/1
2 AEROSOL, METERED RESPIRATORY (INHALATION) 2 TIMES DAILY
Qty: 3 EACH | Refills: 5 | Status: SHIPPED | OUTPATIENT
Start: 2024-03-04

## 2024-03-04 RX ORDER — ALBUTEROL SULFATE 90 UG/1
AEROSOL, METERED RESPIRATORY (INHALATION)
Qty: 18 G | Refills: 5 | Status: SHIPPED | OUTPATIENT
Start: 2024-03-04

## 2024-03-04 ASSESSMENT — PATIENT HEALTH QUESTIONNAIRE - PHQ9
2. FEELING DOWN, DEPRESSED OR HOPELESS: 0
SUM OF ALL RESPONSES TO PHQ QUESTIONS 1-9: 0
SUM OF ALL RESPONSES TO PHQ9 QUESTIONS 1 & 2: 0
1. LITTLE INTEREST OR PLEASURE IN DOING THINGS: 0

## 2024-04-12 ENCOUNTER — E-VISIT (OUTPATIENT)
Age: 54
End: 2024-04-12

## 2024-04-12 DIAGNOSIS — B37.31 CANDIDIASIS OF FEMALE GENITALIA: Primary | ICD-10-CM

## 2024-04-13 RX ORDER — FLUCONAZOLE 150 MG/1
150 TABLET ORAL
Qty: 2 TABLET | Refills: 0 | Status: SHIPPED | OUTPATIENT
Start: 2024-04-13 | End: 2024-04-19

## 2024-04-13 NOTE — PROGRESS NOTES
HPI-as per patient provided history    Exam-not applicable  Electronic visit    Diagnosis  Candidiasis of the female genitalia    Treatment  Diflucan 150 mg tablet  1 tablet today may repeat in 72 hours if symptoms still persist    See patient instructions    A vaginal yeast infection is the growth of too many yeast cells in the vagina. This is a common problem. Itching, vaginal discharge and irritation, and other symptoms can bother you. But yeast infections don't often cause other health problems.  Some medicines can increase your risk of getting a yeast infection. These include antibiotics, hormones, and steroids. You may also be more likely to get a yeast infection if you are pregnant, have diabetes, douche, or wear tight clothes.  With treatment, most yeast infections get better in a few days.  Follow-up care is a key part of your treatment and safety. Be sure to make and go to all appointments, and call your doctor if you are having problems. It's also a good idea to know your test results and keep a list of the medicines you take.  How can you care for yourself at home?  Take your medicines exactly as prescribed. Call your doctor if you think you are having a problem with your medicine.  Ask your doctor about over-the-counter (OTC) medicines for yeast infections. If you use an OTC treatment, read and follow all instructions on the label.  Don't use tampons while using a vaginal cream or suppository. The tampons can absorb the medicine. Use pads instead.  Wear loose cotton clothing. Don't wear nylon or other fabric that holds body heat and moisture close to the skin.  Try sleeping without underwear.  Don't scratch. Relieve itching with a cold pack or a cool bath.  Don't wash your vulva more than once a day. Use plain water or a mild, unscented soap. Air-dry the vulva.  Change out of wet or damp clothes as soon as possible.  If you are using a vaginal medicine, don't have sex until you have finished your

## 2024-06-12 ENCOUNTER — OFFICE VISIT (OUTPATIENT)
Dept: OBGYN | Age: 54
End: 2024-06-12
Payer: COMMERCIAL

## 2024-06-12 VITALS
HEIGHT: 62 IN | RESPIRATION RATE: 16 BRPM | SYSTOLIC BLOOD PRESSURE: 112 MMHG | BODY MASS INDEX: 24.84 KG/M2 | WEIGHT: 135 LBS | HEART RATE: 82 BPM | DIASTOLIC BLOOD PRESSURE: 74 MMHG

## 2024-06-12 DIAGNOSIS — T19.9XXA FOREIGN BODY IN GENITOURINARY TRACT, INITIAL ENCOUNTER: ICD-10-CM

## 2024-06-12 DIAGNOSIS — N39.3 STRESS INCONTINENCE: ICD-10-CM

## 2024-06-12 DIAGNOSIS — R10.2 CHRONIC PELVIC PAIN IN FEMALE: Primary | ICD-10-CM

## 2024-06-12 DIAGNOSIS — G89.29 CHRONIC PELVIC PAIN IN FEMALE: Primary | ICD-10-CM

## 2024-06-12 PROCEDURE — 99204 OFFICE O/P NEW MOD 45 MIN: CPT | Performed by: OBSTETRICS & GYNECOLOGY

## 2024-06-12 PROCEDURE — 1036F TOBACCO NON-USER: CPT | Performed by: OBSTETRICS & GYNECOLOGY

## 2024-06-12 PROCEDURE — 3017F COLORECTAL CA SCREEN DOC REV: CPT | Performed by: OBSTETRICS & GYNECOLOGY

## 2024-06-12 PROCEDURE — G8427 DOCREV CUR MEDS BY ELIG CLIN: HCPCS | Performed by: OBSTETRICS & GYNECOLOGY

## 2024-06-12 PROCEDURE — G8420 CALC BMI NORM PARAMETERS: HCPCS | Performed by: OBSTETRICS & GYNECOLOGY

## 2024-06-12 NOTE — PROGRESS NOTES
Pt to return in 6 weeks to review results of the filshie clip  
reflects dose increase 3 each 5    albuterol sulfate HFA (PROVENTIL;VENTOLIN;PROAIR) 108 (90 Base) MCG/ACT inhaler Inhale 2 puffs into the lungs every 6 hours as needed for Wheezing 8.5 g 5    brompheniramine-pseudoephedrine-DM 2-30-10 MG/5ML syrup Take 5 mLs by mouth 4 times daily as needed for Congestion or Cough (Patient not taking: Reported on 2024) 118 mL 0     No current facility-administered medications for this visit.         Social History:   Social History     Tobacco Use    Smoking status: Former     Current packs/day: 0.00     Average packs/day: 0.5 packs/day for 3.0 years (1.5 ttl pk-yrs)     Types: Cigarettes     Start date: 6/10/1985     Quit date: 6/10/1988     Years since quittin.0    Smokeless tobacco: Never   Substance Use Topics    Alcohol use: No        Family History:   Family History   Problem Relation Age of Onset    Heart Failure Mother     Diabetes Mother     Heart Disease Mother         two heart attacks at age 51;  at 67; congestive heart failure possible heart attack/pulmonary embolism    High Cholesterol Mother     Miscarriages / Stillbirths Mother         2 miscarriages    Obesity Mother     Other Father     Diabetes Father     High Blood Pressure Father          of stroke at 68; also diabetic; one kidney; on dialysis    Kidney Disease Father         hospitalized for long time as a young child (8 or 10 years old) with nephritis; history of kidney stones; one blockage so bad kidney damaged and eventually lost it; history of diabetes after 50 years old.    Learning Disabilities Father         dyslexia    Obesity Father     Stroke Father         stroke and  at 68    Breast Cancer Paternal Great Grandmother 32    Heart Disease Maternal Grandfather          at 61 heart attack    Diabetes Maternal Grandmother     High Blood Pressure Maternal Grandmother          of stroke in late 70s; paralyzed on left side last 2 years of life    Stroke Maternal Grandmother

## 2024-06-14 ENCOUNTER — OFFICE VISIT (OUTPATIENT)
Dept: PRIMARY CARE CLINIC | Age: 54
End: 2024-06-14
Payer: COMMERCIAL

## 2024-06-14 VITALS
RESPIRATION RATE: 18 BRPM | HEIGHT: 62 IN | BODY MASS INDEX: 24.66 KG/M2 | WEIGHT: 134 LBS | SYSTOLIC BLOOD PRESSURE: 103 MMHG | DIASTOLIC BLOOD PRESSURE: 71 MMHG | TEMPERATURE: 97.3 F | OXYGEN SATURATION: 97 % | HEART RATE: 56 BPM

## 2024-06-14 DIAGNOSIS — K04.7 DENTAL INFECTION: Primary | ICD-10-CM

## 2024-06-14 PROCEDURE — G8420 CALC BMI NORM PARAMETERS: HCPCS | Performed by: NURSE PRACTITIONER

## 2024-06-14 PROCEDURE — G8427 DOCREV CUR MEDS BY ELIG CLIN: HCPCS | Performed by: NURSE PRACTITIONER

## 2024-06-14 PROCEDURE — 1036F TOBACCO NON-USER: CPT | Performed by: NURSE PRACTITIONER

## 2024-06-14 PROCEDURE — 99213 OFFICE O/P EST LOW 20 MIN: CPT | Performed by: NURSE PRACTITIONER

## 2024-06-14 PROCEDURE — 3017F COLORECTAL CA SCREEN DOC REV: CPT | Performed by: NURSE PRACTITIONER

## 2024-06-14 RX ORDER — PREDNISONE 10 MG/1
10 TABLET ORAL 2 TIMES DAILY
Qty: 10 TABLET | Refills: 0 | Status: SHIPPED | OUTPATIENT
Start: 2024-06-14 | End: 2024-06-19

## 2024-06-14 RX ORDER — AMOXICILLIN AND CLAVULANATE POTASSIUM 875; 125 MG/1; MG/1
1 TABLET, FILM COATED ORAL 2 TIMES DAILY
Qty: 20 TABLET | Refills: 0 | Status: SHIPPED | OUTPATIENT
Start: 2024-06-14 | End: 2024-06-24

## 2024-06-14 NOTE — PROGRESS NOTES
maternal grandfather and mother; Heart Failure in her mother; High Blood Pressure in her father and maternal grandmother; High Cholesterol in her mother; Kidney Disease in her father and paternal aunt; Learning Disabilities in her father; Miscarriages / Stillbirths in her mother; Obesity in her father and mother; Other in her father and maternal grandmother; Prostate Cancer in her paternal grandfather; Stroke in her father and maternal grandmother.   Allergies: Sulfa antibiotics    Physical Exam         VS:  /71   Pulse 56   Temp 97.3 °F (36.3 °C) (Temporal)   Resp 18   Ht 1.575 m (5' 2\")   Wt 60.8 kg (134 lb)   LMP 06/01/2024   SpO2 97%   BMI 24.51 kg/m²    Oxygen Saturation Interpretation: Normal.    Constitutional:  Alert, development consistent with age.  Mouth:  Moist bucca mucosa and normal tongue.  Moderate redness to left upper gum region. Teeth intact.  Throat: No posterior pharyngeal erythema, exudates or lesions.   Neck:  Supple. There is no obvious adenopathy or neck tenderness.  Lungs:   Breath sounds: Normal chest expansion and breath sounds noted throughout.  No wheezes, rales, or rhonchi noted.    Heart:  Regular rate and rhythm, normal heart sounds, without pathological murmurs, ectopy, gallops, or rubs.  Skin:  Normal turgor.  Warm, dry, without visible rash.  Neurological:  Oriented.  Motor functions intact.       Lab / Imaging Results   (All laboratory and radiology results have been personally reviewed by myself)  Labs:  No results found for this visit on 06/14/24.    Imaging:  All Radiology results interpreted by Radiologist unless otherwise noted.  No orders to display         Assessment / Plan     Impression(s):  1. Dental infection      Disposition:  Disposition: Advised to start Augmentin and Prednisone. Keep scheduled appointment w/Dentist on 7/29/24. Contact Dentist w/any worsening or concerning issues

## 2024-07-09 ENCOUNTER — EVALUATION (OUTPATIENT)
Dept: PHYSICAL THERAPY | Age: 54
End: 2024-07-09
Payer: COMMERCIAL

## 2024-07-09 ENCOUNTER — HOSPITAL ENCOUNTER (OUTPATIENT)
Dept: MRI IMAGING | Age: 54
Discharge: HOME | End: 2024-07-11
Attending: OBSTETRICS & GYNECOLOGY
Payer: COMMERCIAL

## 2024-07-09 DIAGNOSIS — R10.2 CHRONIC PELVIC PAIN IN FEMALE: ICD-10-CM

## 2024-07-09 DIAGNOSIS — T19.9XXA FOREIGN BODY IN GENITOURINARY TRACT, INITIAL ENCOUNTER: ICD-10-CM

## 2024-07-09 DIAGNOSIS — N39.3 STRESS INCONTINENCE: ICD-10-CM

## 2024-07-09 DIAGNOSIS — G89.29 CHRONIC PELVIC PAIN IN FEMALE: ICD-10-CM

## 2024-07-09 DIAGNOSIS — R10.2 CHRONIC FEMALE PELVIC PAIN: Primary | ICD-10-CM

## 2024-07-09 DIAGNOSIS — G89.29 CHRONIC FEMALE PELVIC PAIN: Primary | ICD-10-CM

## 2024-07-09 PROCEDURE — 72197 MRI PELVIS W/O & W/DYE: CPT

## 2024-07-09 PROCEDURE — 6360000004 HC RX CONTRAST MEDICATION: Performed by: RADIOLOGY

## 2024-07-09 PROCEDURE — 97162 PT EVAL MOD COMPLEX 30 MIN: CPT | Performed by: PHYSICAL THERAPIST

## 2024-07-09 PROCEDURE — A9579 GAD-BASE MR CONTRAST NOS,1ML: HCPCS | Performed by: RADIOLOGY

## 2024-07-09 PROCEDURE — 97110 THERAPEUTIC EXERCISES: CPT | Performed by: PHYSICAL THERAPIST

## 2024-07-09 RX ADMIN — GADOTERIDOL 13 ML: 279.3 INJECTION, SOLUTION INTRAVENOUS at 18:40

## 2024-07-09 NOTE — PROGRESS NOTES
demonstrate appropriate isolation of PFM including appropriate contraction and relaxation.   Pt will be independent in and compliant with performance of a home exercise program provided by therapist and perform as instructed.   Pt will report/demonstrate a reduction of incontinence/leakage episodes by at least 50%.  Pt will demonstrate consistent compliance with urgency suppression strategies.  Pt will demonstrate use of functional pelvic floor muscle contraction by performing a pre-contraction (KNACK) to reduce/eliminate stress incontinence during cough/sneeze/laugh/lifting/jumping, etc.?  Pt will report decreased urinary frequency/increased time between voids to every 2-5 hours.  Pt will report/demonstrate a reduction of nocturia episodes to 0-1 times per night for improved quality of sleep.  Pt will demonstrate an increase in PFM strength by at least 1 muscle grade to improve pelvic stability and reduce incontinence episodes.   Pt will demonstrate and increase in PFM endurance by at least 2 second hold for 10 repetitions as measured by sEMG or manually by therapist.?   Pt will report improved walking/exercise distance and/or time by 30 minutes minutes/miles without urgency or leakage.   Pt will demonstrate consistent and adequate fluid intake/decreased irritant intake per therapist specifications.     Bowels:   Pt will demonstrate normalized contraction/relaxation of pelvic floor?and demonstrate absence of a paradoxical contraction to promote normal voiding.   Pt will report normalized stool consistency occurring 75% of the time or greater.   Pt will demonstrate adequate fiber intake to achieve ideal stool consistency.   Pt will report at least a 75% reduction of excessive pushing/straining required to empty bowels.   Pt will report time spent completing bowel movements to be between 5 and 12 minutes.       Patient understands diagnosis/prognosis and consents to treatment, plan and goals: [x] Yes    [] No

## 2024-07-15 ENCOUNTER — OFFICE VISIT (OUTPATIENT)
Dept: FAMILY MEDICINE CLINIC | Age: 54
End: 2024-07-15
Payer: COMMERCIAL

## 2024-07-15 VITALS
BODY MASS INDEX: 24.48 KG/M2 | RESPIRATION RATE: 16 BRPM | DIASTOLIC BLOOD PRESSURE: 60 MMHG | OXYGEN SATURATION: 97 % | SYSTOLIC BLOOD PRESSURE: 98 MMHG | WEIGHT: 133 LBS | HEIGHT: 62 IN | HEART RATE: 78 BPM | TEMPERATURE: 97.9 F

## 2024-07-15 DIAGNOSIS — F34.1 DYSTHYMIA: Primary | ICD-10-CM

## 2024-07-15 PROCEDURE — 99214 OFFICE O/P EST MOD 30 MIN: CPT | Performed by: FAMILY MEDICINE

## 2024-07-15 PROCEDURE — G8420 CALC BMI NORM PARAMETERS: HCPCS | Performed by: FAMILY MEDICINE

## 2024-07-15 PROCEDURE — G8427 DOCREV CUR MEDS BY ELIG CLIN: HCPCS | Performed by: FAMILY MEDICINE

## 2024-07-15 PROCEDURE — 3017F COLORECTAL CA SCREEN DOC REV: CPT | Performed by: FAMILY MEDICINE

## 2024-07-15 PROCEDURE — 1036F TOBACCO NON-USER: CPT | Performed by: FAMILY MEDICINE

## 2024-07-15 RX ORDER — ESCITALOPRAM OXALATE 5 MG/1
5 TABLET ORAL DAILY
Qty: 30 TABLET | Refills: 2 | Status: SHIPPED | OUTPATIENT
Start: 2024-07-15

## 2024-07-15 SDOH — ECONOMIC STABILITY: FOOD INSECURITY: WITHIN THE PAST 12 MONTHS, YOU WORRIED THAT YOUR FOOD WOULD RUN OUT BEFORE YOU GOT MONEY TO BUY MORE.: NEVER TRUE

## 2024-07-15 SDOH — ECONOMIC STABILITY: FOOD INSECURITY: WITHIN THE PAST 12 MONTHS, THE FOOD YOU BOUGHT JUST DIDN'T LAST AND YOU DIDN'T HAVE MONEY TO GET MORE.: NEVER TRUE

## 2024-07-15 SDOH — ECONOMIC STABILITY: HOUSING INSECURITY
IN THE LAST 12 MONTHS, WAS THERE A TIME WHEN YOU DID NOT HAVE A STEADY PLACE TO SLEEP OR SLEPT IN A SHELTER (INCLUDING NOW)?: NO

## 2024-07-15 SDOH — ECONOMIC STABILITY: INCOME INSECURITY: HOW HARD IS IT FOR YOU TO PAY FOR THE VERY BASICS LIKE FOOD, HOUSING, MEDICAL CARE, AND HEATING?: NOT HARD AT ALL

## 2024-07-15 NOTE — PROGRESS NOTES
7/29/2024    Chief Complaint   Patient presents with    Headache    hot flashes      Off and on all day, and at nighttime       HPI    Angeli Livingston is a 53 y.o. patient that presents today for:    Migraine Headaches:  Angeli Livingston is a 53 y.o. female who complains of a history of migraine headaches for many years. This is  similar to headaches in the past.     Frequency of episodes is few per month.   Duration: hour(s).  Triggers: no known precipitant  Description of pain: throbbing pain.  Hot flashes.  These are worsening.  Feeling stressed      There are no associated abnormal neurological symptoms such as TIA's, loss of balance, loss of vision or speech, numbness or weakness on review. Past neurological history: negative for stroke, MS, epilepsy, or brain tumor.              Patient's past medical, surgical, social and/or family history reviewed, updated in chart, and are non-contributory (unless otherwise stated).  Medications and allergies also reviewed and updated in chart.     ROS negative unless otherwise specified    Physical Exam  Temp Readings from Last 3 Encounters:   07/15/24 97.9 °F (36.6 °C)   06/14/24 97.3 °F (36.3 °C) (Temporal)   03/04/24 98.4 °F (36.9 °C) (Temporal)     Wt Readings from Last 3 Encounters:   07/24/24 60.6 kg (133 lb 9.6 oz)   07/15/24 60.3 kg (133 lb)   06/14/24 60.8 kg (134 lb)     BP Readings from Last 3 Encounters:   07/24/24 105/62   07/15/24 98/60   06/14/24 103/71     Pulse Readings from Last 3 Encounters:   07/24/24 88   07/15/24 78   06/14/24 56       General appearance: alert, well appearing, and in no distress, oriented to person, place, and time and normal appearing weight.    CVS exam: normal rate, regular rhythm, normal S1, S2, no murmurs, rubs, clicks or gallops.  Radial pulses 2+ bilateral.  PT/DP pulse 2+ bilat.  No C/C/E    Chest: clear to auscultation, no wheezes, rales or rhonchi, symmetric air entry.     Abdomen: Soft, non-tender, non-distended,

## 2024-07-16 ENCOUNTER — TELEPHONE (OUTPATIENT)
Age: 54
End: 2024-07-16

## 2024-07-16 NOTE — TELEPHONE ENCOUNTER
[facility] received referral in [facility] for a diagnosis of dysthymia.  Call placed to pt today and message left requesting return call at her convenience. Katerina Echevarria, MSW, ROB

## 2024-07-19 ENCOUNTER — TREATMENT (OUTPATIENT)
Dept: PHYSICAL THERAPY | Age: 54
End: 2024-07-19
Payer: COMMERCIAL

## 2024-07-19 DIAGNOSIS — R10.2 CHRONIC FEMALE PELVIC PAIN: Primary | ICD-10-CM

## 2024-07-19 DIAGNOSIS — G89.29 CHRONIC FEMALE PELVIC PAIN: Primary | ICD-10-CM

## 2024-07-19 DIAGNOSIS — N39.3 STRESS INCONTINENCE: ICD-10-CM

## 2024-07-19 PROCEDURE — 97530 THERAPEUTIC ACTIVITIES: CPT | Performed by: PHYSICAL THERAPIST

## 2024-07-19 PROCEDURE — 97140 MANUAL THERAPY 1/> REGIONS: CPT | Performed by: PHYSICAL THERAPIST

## 2024-07-19 PROCEDURE — 97110 THERAPEUTIC EXERCISES: CPT | Performed by: PHYSICAL THERAPIST

## 2024-07-19 NOTE — PROGRESS NOTES
PHYSICAL THERAPY PROGRESS NOTE    Date:  2024  Initial Evaluation Date: 2024    Patient Name:  Angeli Livingston   :  1970    Restrictions/Precautions:  none  Diagnosis:    Diagnosis Orders   1. Chronic female pelvic pain          2. Stress incontinence                                                                        Insurance/Certification information:  [employer]  Referring Physician:  Shantel Johnston DO  Date of Surgery/Injury: n/a     Visit# / total visits:      Evaluating Therapist: Madhavi Humphrey PT, DPT     [id number]    Treating Therapist: Madhavi Humphrey PT, DPT  [id number]  Past Medical History:   Past Medical History:   Diagnosis Date    Abnormal Pap smear of cervix     LEEP    Asthma     diagnosed at age 17    Thyroid disease     Trigeminal neuralgia      Past Surgical History:   Past Surgical History:   Procedure Laterality Date     SECTION      x1    LAPAROSCOPY      filshie clip removed from pelvis, sitting on bladder    LAPAROTOMY      uterine rupture repair, filshie clips at time of surgery    LEEP      TONSILLECTOMY      TUBAL LIGATION         Patient identified by name and birth date: Yes     Pain:   Current pain level: denies/10.      SUBJECTIVE    Forgot bladder diary at home, however completed it and noticed tea increased bathroom trips. Reports no leaking this week, however went to the bathroom before walks, leaving the house and to [place] [employer] [place]annah [place].    OBJECTIVE     External Connective Tissue/Muscle Assessment: ?  Abdominal Wall: NT  Breathing: NT    Informed consent for internal evaluation consent given : yes, signed copy in chart    External PF Observation:  Contraction: WNL, symmetric posterior/anterior engagement  Relax/Rest: slightly slowed return to resting following max contraction  Perineal Bulge: WNL  Introitus:WNL parous apperance    Inspiration: WNL  Cough: descent noted without pre-contraction,

## 2024-07-24 ENCOUNTER — TELEPHONE (OUTPATIENT)
Age: 54
End: 2024-07-24

## 2024-07-24 ENCOUNTER — HOSPITAL ENCOUNTER (OUTPATIENT)
Age: 54
Discharge: HOME | End: 2024-07-26
Payer: COMMERCIAL

## 2024-07-24 ENCOUNTER — HOSPITAL ENCOUNTER (OUTPATIENT)
Dept: GENERAL RADIOLOGY | Age: 54
Discharge: HOME | End: 2024-07-26
Payer: COMMERCIAL

## 2024-07-24 ENCOUNTER — OFFICE VISIT (OUTPATIENT)
Dept: OBGYN | Age: 54
End: 2024-07-24
Payer: COMMERCIAL

## 2024-07-24 VITALS
SYSTOLIC BLOOD PRESSURE: 105 MMHG | HEART RATE: 88 BPM | DIASTOLIC BLOOD PRESSURE: 62 MMHG | BODY MASS INDEX: 24.44 KG/M2 | WEIGHT: 133.6 LBS

## 2024-07-24 DIAGNOSIS — R10.2 CHRONIC PELVIC PAIN IN FEMALE: ICD-10-CM

## 2024-07-24 DIAGNOSIS — M54.9 BACK PAIN, UNSPECIFIED BACK LOCATION, UNSPECIFIED BACK PAIN LATERALITY, UNSPECIFIED CHRONICITY: ICD-10-CM

## 2024-07-24 DIAGNOSIS — G89.29 CHRONIC PELVIC PAIN IN FEMALE: ICD-10-CM

## 2024-07-24 DIAGNOSIS — G89.29 CHRONIC PELVIC PAIN IN FEMALE: Primary | ICD-10-CM

## 2024-07-24 DIAGNOSIS — T19.9XXA FOREIGN BODY IN GENITOURINARY TRACT, INITIAL ENCOUNTER: ICD-10-CM

## 2024-07-24 DIAGNOSIS — R10.2 CHRONIC PELVIC PAIN IN FEMALE: Primary | ICD-10-CM

## 2024-07-24 PROCEDURE — 99213 OFFICE O/P EST LOW 20 MIN: CPT | Performed by: OBSTETRICS & GYNECOLOGY

## 2024-07-24 PROCEDURE — G8420 CALC BMI NORM PARAMETERS: HCPCS | Performed by: OBSTETRICS & GYNECOLOGY

## 2024-07-24 PROCEDURE — 1036F TOBACCO NON-USER: CPT | Performed by: OBSTETRICS & GYNECOLOGY

## 2024-07-24 PROCEDURE — G8427 DOCREV CUR MEDS BY ELIG CLIN: HCPCS | Performed by: OBSTETRICS & GYNECOLOGY

## 2024-07-24 PROCEDURE — 3017F COLORECTAL CA SCREEN DOC REV: CPT | Performed by: OBSTETRICS & GYNECOLOGY

## 2024-07-24 PROCEDURE — 74018 RADEX ABDOMEN 1 VIEW: CPT

## 2024-07-24 PROCEDURE — 72110 X-RAY EXAM L-2 SPINE 4/>VWS: CPT

## 2024-07-24 NOTE — PROGRESS NOTES
Patient alert and pleasant with no complaints.  Here today for ultrasound results.  Discharge instructions have been discussed with the patient. Patient advised to call our office with any questions or concerns.   Voiced understanding.

## 2024-07-24 NOTE — PROGRESS NOTES
HISTORY OF PRESENT ILLNESS:    Pt presents for follow up for results.  53 y.o. female   presents with complaint of pelvic pain. Pt has history of Filshie clips placed in . Pt had pain in 2017 and had laparoscopy with Dr. Hernandez who found only one clip sitting on her bladder which was removed. The other was not located. Pt has had continued left hip pain. She also reports some rectal pressure into her vagina. No dyspareunia. CT in 2018 showed possible metallic density overlying left iliac wing. Pt called general surgeon for removal but was told they would not do it.      US  normal     Pt had pap in 2023. Normal.    MRI did not show filshie clip,recommended Xray vs CT 2. Pt states insurance will not pay for CT, has to do physical therapy first. Pt states regardless of PT helps, she wants filshie clip removed.    Currently in pelvic floor therapy for above and stress urinary incontinence.       Past Medical History:   Past Medical History:   Diagnosis Date    Abnormal Pap smear of cervix     LEEP    Asthma     diagnosed at age 17    Thyroid disease     Trigeminal neuralgia                                              OB History    Para Term  AB Living   5 5 5         SAB IAB Ectopic Molar Multiple Live Births                    # Outcome Date GA Lbr Boo/2nd Weight Sex Delivery Anes PTL Lv   5 Term      Vag-Spont         Complications: Uterine Rupture   4 Term      Vag-Spont      3 Term      Vag-Spont      2 Term      CS-LTranv      1 Term      Vag-Spont            Past Surgical History:   Past Surgical History:   Procedure Laterality Date     SECTION      x1    DILATION AND CURETTAGE      LAPAROSCOPY  2017    filshie clip removed from pelvis, sitting on bladder    LAPAROTOMY      uterine rupture repair, filshie clips at time of surgery    LEEP      TONSILLECTOMY      TUBAL LIGATION          Allergies: Cat hair extract and Sulfa antibiotics

## 2024-07-24 NOTE — TELEPHONE ENCOUNTER
[facility]  Call placed to pt today and discussed referral with pt. She would like to be seen for session on either Thurs/Friday. Appointment made for 8/2 at 1pm. Katerina Echevarria, MSW, SARA-S

## 2024-07-25 ENCOUNTER — E-VISIT (OUTPATIENT)
Dept: FAMILY MEDICINE CLINIC | Age: 54
End: 2024-07-25
Payer: COMMERCIAL

## 2024-07-25 DIAGNOSIS — N76.0 ACUTE VAGINITIS: Primary | ICD-10-CM

## 2024-07-25 PROCEDURE — 99422 OL DIG E/M SVC 11-20 MIN: CPT | Performed by: FAMILY MEDICINE

## 2024-07-25 RX ORDER — FLUCONAZOLE 150 MG/1
150 TABLET ORAL DAILY
Qty: 3 TABLET | Refills: 0 | Status: SHIPPED | OUTPATIENT
Start: 2024-07-25

## 2024-07-25 NOTE — PROGRESS NOTES
E-visit questionnaire reviewed. Chart appropriately reviewed given context. Allergies reviewed.   A/P: vaginitis- will send in diflucan   11-20 minutes were spent on the digital evaluation and management of this patient.

## 2024-07-26 ENCOUNTER — TREATMENT (OUTPATIENT)
Dept: PHYSICAL THERAPY | Age: 54
End: 2024-07-26
Payer: COMMERCIAL

## 2024-07-26 DIAGNOSIS — R10.2 CHRONIC FEMALE PELVIC PAIN: Primary | ICD-10-CM

## 2024-07-26 DIAGNOSIS — G89.29 CHRONIC FEMALE PELVIC PAIN: Primary | ICD-10-CM

## 2024-07-26 DIAGNOSIS — N39.3 STRESS INCONTINENCE: ICD-10-CM

## 2024-07-26 PROCEDURE — 97110 THERAPEUTIC EXERCISES: CPT | Performed by: PHYSICAL THERAPIST

## 2024-07-26 PROCEDURE — 97530 THERAPEUTIC ACTIVITIES: CPT | Performed by: PHYSICAL THERAPIST

## 2024-07-26 NOTE — PROGRESS NOTES
PHYSICAL THERAPY PROGRESS NOTE    Date:  2024  Initial Evaluation Date: 2024    Patient Name:  Angeli Livingston   :  1970    Restrictions/Precautions:  none  Diagnosis:    Diagnosis Orders   1. Chronic female pelvic pain          2. Stress incontinence                                                                        Insurance/Certification information:  [employer]  Referring Physician:  Shantel Johnston DO  Date of Surgery/Injury: n/a     Visit# / total visits: 3 / 12     Evaluating Therapist: Madhavi Humphrey PT, DPT     [id number]    Treating Therapist: Madhavi Humphrey PT, DPT  [id number]  Past Medical History:   Past Medical History:   Diagnosis Date    Abnormal Pap smear of cervix     LEEP    Asthma     diagnosed at age 17    Thyroid disease     Trigeminal neuralgia      Past Surgical History:   Past Surgical History:   Procedure Laterality Date     SECTION      x1    DILATION AND CURETTAGE      LAPAROSCOPY      filshie clip removed from pelvis, sitting on bladder    LAPAROTOMY      uterine rupture repair, filshie clips at time of surgery    LEEP      TONSILLECTOMY      TUBAL LIGATION         Patient identified by name and birth date: Yes     Pain:   Current pain level: denies/10.      SUBJECTIVE      Pt reports feeling \"different\" in the pelvis with increased engagement. She has been focusing to decrease glute compensation with PF contractions. No pelvic pain, only lumbar pain.     OBJECTIVE     External Connective Tissue/Muscle Assessment: ?  Abdominal Wall: NT  Breathing: NT    Informed consent for internal evaluation consent given : deferred this date.    External PF Observation: --        Internal Pelvic Floor Connective Tissue/Muscle Tone  (from 24 session):   Left Right   Introitus WNL WNL   Bulbospongiosus WNL WNL   Ischiocavernosis WNL WNL   Periurethrals WNL WNL   Perineal body WNL WNL   Pubococcygeus WNL WNL   Iliococcygeus WNL WNL   Coccygeus WNL WNL

## 2024-08-02 ENCOUNTER — TREATMENT (OUTPATIENT)
Dept: PHYSICAL THERAPY | Age: 54
End: 2024-08-02
Payer: COMMERCIAL

## 2024-08-02 DIAGNOSIS — N39.3 STRESS INCONTINENCE: Primary | ICD-10-CM

## 2024-08-02 DIAGNOSIS — G89.29 CHRONIC FEMALE PELVIC PAIN: ICD-10-CM

## 2024-08-02 DIAGNOSIS — R10.2 CHRONIC FEMALE PELVIC PAIN: ICD-10-CM

## 2024-08-02 PROCEDURE — 97110 THERAPEUTIC EXERCISES: CPT | Performed by: PHYSICAL THERAPIST

## 2024-08-02 NOTE — PROGRESS NOTES
PHYSICAL THERAPY PROGRESS NOTE    Date:  2024  Initial Evaluation Date: 2024    Patient Name:  Angeli Livingston   :  1970    Restrictions/Precautions:  none  Diagnosis:    Diagnosis Orders   1. Chronic female pelvic pain          2. Stress incontinence                                                                        Insurance/Certification information:  [employer]  Referring Physician:  Shantel Johnston DO  Date of Surgery/Injury: n/a     Visit# / total visits:      Evaluating Therapist: Madhavi Humphrey PT, DPT     [id number]    Treating Therapist: Madhavi Humphrey PT, DPT  [id number]  Past Medical History:   Past Medical History:   Diagnosis Date    Abnormal Pap smear of cervix     LEEP    Asthma     diagnosed at age 17    Thyroid disease     Trigeminal neuralgia      Past Surgical History:   Past Surgical History:   Procedure Laterality Date     SECTION      x1    DILATION AND CURETTAGE      LAPAROSCOPY      filshie clip removed from pelvis, sitting on bladder    LAPAROTOMY      uterine rupture repair, filshie clips at time of surgery    LEEP      TONSILLECTOMY      TUBAL LIGATION         Patient identified by name and birth date: Yes     Pain:   Current pain level: denies/10.      SUBJECTIVE      Pt has no c/o pelvic pain, only lumbar. She notes compliance with HEP with emphasis on decreasing glute engagement.    OBJECTIVE     External Connective Tissue/Muscle Assessment: ?  Abdominal Wall: NT  Breathing: NT    Informed consent for internal evaluation consent given : deferred this date.    External PF Observation: --        Internal Pelvic Floor Connective Tissue/Muscle Tone  (from 24 session):   Left Right   Introitus WNL WNL   Bulbospongiosus WNL WNL   Ischiocavernosis WNL WNL   Periurethrals WNL WNL   Perineal body WNL WNL   Pubococcygeus WNL WNL   Iliococcygeus WNL WNL   Coccygeus WNL WNL   Obturator Internus WNL WNL   External anal sphincter NT NT

## 2024-08-16 ENCOUNTER — TREATMENT (OUTPATIENT)
Dept: PHYSICAL THERAPY | Age: 54
End: 2024-08-16
Payer: COMMERCIAL

## 2024-08-16 ENCOUNTER — TELEPHONE (OUTPATIENT)
Dept: OBGYN | Age: 54
End: 2024-08-16

## 2024-08-16 DIAGNOSIS — G89.29 CHRONIC PELVIC PAIN IN FEMALE: Primary | ICD-10-CM

## 2024-08-16 DIAGNOSIS — G89.29 CHRONIC FEMALE PELVIC PAIN: ICD-10-CM

## 2024-08-16 DIAGNOSIS — R10.2 CHRONIC FEMALE PELVIC PAIN: ICD-10-CM

## 2024-08-16 DIAGNOSIS — N39.3 STRESS INCONTINENCE: Primary | ICD-10-CM

## 2024-08-16 DIAGNOSIS — R10.2 CHRONIC PELVIC PAIN IN FEMALE: Primary | ICD-10-CM

## 2024-08-16 DIAGNOSIS — T19.9XXA FOREIGN BODY IN GENITOURINARY TRACT, INITIAL ENCOUNTER: ICD-10-CM

## 2024-08-16 PROCEDURE — 97530 THERAPEUTIC ACTIVITIES: CPT | Performed by: PHYSICAL THERAPIST

## 2024-08-16 PROCEDURE — 97110 THERAPEUTIC EXERCISES: CPT | Performed by: PHYSICAL THERAPIST

## 2024-08-16 NOTE — PROGRESS NOTES
Bulbospongiosus WNL WNL   Ischiocavernosis WNL WNL   Periurethrals WNL WNL   Perineal body WNL WNL   Pubococcygeus WNL WNL   Iliococcygeus WNL WNL   Coccygeus WNL WNL   Obturator Internus WNL WNL   External anal sphincter NT NT   Puborectalis NT NT       Tone assessment:  --    Pelvic Floor Internal Strength Assessment (from 7/19/24 session):   Good (4)  Endurance: 5 seconds (10 second max)  Repetitions: 4 (MVC with 4 seconds rest between each contraction until fatigue)  Fast/Quick Flicks: NT        TREATMENT INTERVENTIONS     Manual Therapy (11569): --    Neuromuscular Re-Education (20694):    Therapeutic activities (52511): 10 min (7386-1393)  Pt was educated on anatomy/physiology of chief complaint  Pt was educated via verbal, tactile feedback on appropriate abdominal, diaphragm, and pelvic floor neuromuscular coordination and mobility necessary to address chief complaint.   Pt was educated and instructed in appropriate diaphragmatic breathing   Pt was educated on pelvic/body awareness   Reviewed progress of symptoms and LTG      Therapeutic Exercise (27486): 35 min (0405-1463)  Supine PF+TRA Bracing 5x 5 sec holds  Supine bridges with TRA+PF 10x 3 sec holds  Sidelying clamshell with TRA+PF 15x 2 sec hold with Blue TB  Hip adduction with TRA+PF 10x 3 sec hold  Supine marches with TRA+PF 10x ea side  Supine hip drop outs with TRA+PF 5x2 ea side 3  Sit<>stand with TRA+PF and breath coordination 10x  Standing wall PF tilts and heel raise x 10 ea LE, alternating  Seated ball marches with TRA and PF engagement 10x ea side, non-reciprocal  Seated rows on ball x15 GTB with TRA+PF  Seated bicep curls on ball x15 5# ea UE with TRA+PF  Squat pulse ups with B hip ER for PF engagement 20x    Pt educated extensively on breath coordination throughout all TE's and with lifting mechanics to decrease risk of worsening anterior wall descent.    Patient Education:   Pt educated re:   [x]  Plans/Goals, Risk/Benefits [x] Anatomy

## 2024-08-16 NOTE — TELEPHONE ENCOUNTER
Pt called in On license of [facility] an appt on 10/9/24 for her xrays results, she said doctor advised her she might need a CT scan done, she would like to know if doctor can put the CT scan order in so she can have it done by her appt on 10/9/24. Please advise. Thank you

## 2024-08-30 ENCOUNTER — TREATMENT (OUTPATIENT)
Dept: PHYSICAL THERAPY | Age: 54
End: 2024-08-30

## 2024-08-30 DIAGNOSIS — N39.3 STRESS INCONTINENCE: Primary | ICD-10-CM

## 2024-08-30 DIAGNOSIS — G89.29 CHRONIC FEMALE PELVIC PAIN: ICD-10-CM

## 2024-08-30 DIAGNOSIS — R10.2 CHRONIC FEMALE PELVIC PAIN: ICD-10-CM

## 2024-08-30 NOTE — PROGRESS NOTES
PHYSICAL THERAPY PROGRESS NOTE    Date:  2024  Initial Evaluation Date: 2024    Patient Name:  Angeli Livingston   :  1970    Restrictions/Precautions:  none  Diagnosis:    Diagnosis Orders   1. Chronic female pelvic pain          2. Stress incontinence                                                                        Insurance/Certification information:  [employer]  Referring Physician:  Shantel Johnston DO  Date of Surgery/Injury: n/a     Visit# / total visits:      Evaluating Therapist: Madhavi Humphrey PT, DPT     [id number]    Treating Therapist: Madhavi Humphrey PT, DPT  [id number]  Past Medical History:   Past Medical History:   Diagnosis Date    Abnormal Pap smear of cervix     LEEP    Asthma     diagnosed at age 17    Thyroid disease     Trigeminal neuralgia      Past Surgical History:   Past Surgical History:   Procedure Laterality Date     SECTION      x1    DILATION AND CURETTAGE      LAPAROSCOPY      filshie clip removed from pelvis, sitting on bladder    LAPAROTOMY      uterine rupture repair, filshie clips at time of surgery    LEEP      TONSILLECTOMY      TUBAL LIGATION         Patient identified by name and birth date: Yes     Pain:   Current pain level: 0/10.       SUBJECTIVE      Pt reports she has had no leakage since last session. Back pain has significantly decreased, has ongoing back pain upon waking but decreasing through the day.4    Bladder:   Daytime voiding frequency: every couple hours -- 3-4 hours between voids  Nighttime voiding frequency: at least 2 times -- 1x/night  Urinary urgency/leaking with urge: yes urge, no leaking while at work -- decreased intensity of urge, no leakage  Triggers for leaking: running water -- no  Leaking with cough/sneeze/laugh/exercise: yes, 50% -- only 1x episode over last month but bladder was very full after work day (had not voided during work)  Amount of incontinence: small -- resolved  Bladder emptying

## 2024-09-29 ENCOUNTER — HOSPITAL ENCOUNTER (OUTPATIENT)
Dept: CT IMAGING | Age: 54
Discharge: HOME | End: 2024-10-01
Attending: OBSTETRICS & GYNECOLOGY
Payer: COMMERCIAL

## 2024-09-29 DIAGNOSIS — G89.29 CHRONIC PELVIC PAIN IN FEMALE: ICD-10-CM

## 2024-09-29 DIAGNOSIS — R10.2 CHRONIC PELVIC PAIN IN FEMALE: ICD-10-CM

## 2024-09-29 DIAGNOSIS — T19.9XXA FOREIGN BODY IN GENITOURINARY TRACT, INITIAL ENCOUNTER: ICD-10-CM

## 2024-09-29 PROCEDURE — 74177 CT ABD & PELVIS W/CONTRAST: CPT

## 2024-09-29 PROCEDURE — 6360000004 HC RX CONTRAST MEDICATION: Performed by: RADIOLOGY

## 2024-09-29 RX ORDER — IOPAMIDOL 755 MG/ML
75 INJECTION, SOLUTION INTRAVASCULAR
Status: COMPLETED | OUTPATIENT
Start: 2024-09-29 | End: 2024-09-29

## 2024-09-29 RX ADMIN — IOPAMIDOL 75 ML: 755 INJECTION, SOLUTION INTRAVENOUS at 09:20

## 2024-10-09 ENCOUNTER — OFFICE VISIT (OUTPATIENT)
Dept: OBGYN | Age: 54
End: 2024-10-09
Payer: COMMERCIAL

## 2024-10-09 VITALS
HEART RATE: 81 BPM | WEIGHT: 133 LBS | BODY MASS INDEX: 24.33 KG/M2 | SYSTOLIC BLOOD PRESSURE: 103 MMHG | DIASTOLIC BLOOD PRESSURE: 57 MMHG

## 2024-10-09 DIAGNOSIS — T19.9XXA FOREIGN BODY IN GENITOURINARY TRACT, INITIAL ENCOUNTER: ICD-10-CM

## 2024-10-09 DIAGNOSIS — G89.29 CHRONIC PELVIC PAIN IN FEMALE: Primary | ICD-10-CM

## 2024-10-09 DIAGNOSIS — Z12.31 SCREENING MAMMOGRAM, ENCOUNTER FOR: ICD-10-CM

## 2024-10-09 DIAGNOSIS — M25.552 LEFT HIP PAIN: ICD-10-CM

## 2024-10-09 DIAGNOSIS — R10.2 CHRONIC PELVIC PAIN IN FEMALE: Primary | ICD-10-CM

## 2024-10-09 PROCEDURE — 1036F TOBACCO NON-USER: CPT | Performed by: OBSTETRICS & GYNECOLOGY

## 2024-10-09 PROCEDURE — G8484 FLU IMMUNIZE NO ADMIN: HCPCS | Performed by: OBSTETRICS & GYNECOLOGY

## 2024-10-09 PROCEDURE — G8427 DOCREV CUR MEDS BY ELIG CLIN: HCPCS | Performed by: OBSTETRICS & GYNECOLOGY

## 2024-10-09 PROCEDURE — 99213 OFFICE O/P EST LOW 20 MIN: CPT | Performed by: OBSTETRICS & GYNECOLOGY

## 2024-10-09 PROCEDURE — 3017F COLORECTAL CA SCREEN DOC REV: CPT | Performed by: OBSTETRICS & GYNECOLOGY

## 2024-10-09 PROCEDURE — G8420 CALC BMI NORM PARAMETERS: HCPCS | Performed by: OBSTETRICS & GYNECOLOGY

## 2024-10-09 NOTE — PROGRESS NOTES
HISTORY OF PRESENT ILLNESS:    Pt presents for follow up for results.  53 y.o. female   presents with complaint of pelvic pain. Pt has history of Filshie clips placed in . Pt had pain in  and had laparoscopy with Dr. Hernandez who found only one clip sitting on her bladder which was removed. The other was not located. Pt states she had bilateral salpingectomy at that time. Pt has had continued left hip pain. She also reports some rectal pressure into her vagina. No dyspareunia. CT in  showed possible metallic density overlying left iliac wing. Pt called general surgeon for removal but was told they would not do it.      US  normal     Pt had pap in 2023. Normal.    MRI did not show filshie clip,recommended Xray vs CT 2. Pt states insurance will not pay for CT, has to do physical therapy first. Pt states regardless of PT helps, she wants filshie clip removed.    Currently in pelvic floor therapy for above and stress urinary incontinence.    Xray 2024 showed single clip in left adnexa  CT 2024 showed left filshie clip slightly lateral to left adnexa projecting over iliopsoas muscle, slightly more inferior to prior CT in 2016.       Past Medical History:   Past Medical History:   Diagnosis Date    Abnormal Pap smear of cervix     LEEP    Asthma     diagnosed at age 17    Thyroid disease     Trigeminal neuralgia                                              OB History    Para Term  AB Living   5 5 5         SAB IAB Ectopic Molar Multiple Live Births                    # Outcome Date GA Lbr Boo/2nd Weight Sex Type Anes PTL Lv   5 Term      Vag-Spont         Complications: Uterine Rupture   4 Term      Vag-Spont      3 Term      Vag-Spont      2 Term      CS-LTranv      1 Term      Vag-Spont            Past Surgical History:   Past Surgical History:   Procedure Laterality Date     SECTION      x1    DILATION AND CURETTAGE      LAPAROSCOPY

## 2024-10-09 NOTE — PROGRESS NOTES
Patient alert and pleasant with no complaints  Here today for imaging results.  Discharge instructions have been discussed with the patient. Patient advised to call our office with any questions or concerns.   Voiced understanding.

## 2024-10-10 ENCOUNTER — PREP FOR PROCEDURE (OUTPATIENT)
Dept: OBGYN | Age: 54
End: 2024-10-10

## 2024-10-10 DIAGNOSIS — R10.2 CHRONIC PELVIC PAIN IN FEMALE: ICD-10-CM

## 2024-10-10 DIAGNOSIS — G89.29 CHRONIC PELVIC PAIN IN FEMALE: ICD-10-CM

## 2024-10-21 SDOH — HEALTH STABILITY: PHYSICAL HEALTH: ON AVERAGE, HOW MANY DAYS PER WEEK DO YOU ENGAGE IN MODERATE TO STRENUOUS EXERCISE (LIKE A BRISK WALK)?: 3 DAYS

## 2024-10-21 SDOH — HEALTH STABILITY: PHYSICAL HEALTH: ON AVERAGE, HOW MANY MINUTES DO YOU ENGAGE IN EXERCISE AT THIS LEVEL?: 40 MIN

## 2024-10-22 ENCOUNTER — OFFICE VISIT (OUTPATIENT)
Dept: ORTHOPEDIC SURGERY | Age: 54
End: 2024-10-22
Payer: COMMERCIAL

## 2024-10-22 DIAGNOSIS — M25.552 PAIN OF LEFT HIP: Primary | ICD-10-CM

## 2024-10-22 DIAGNOSIS — M70.62 GREATER TROCHANTERIC BURSITIS OF LEFT HIP: ICD-10-CM

## 2024-10-22 PROCEDURE — G8420 CALC BMI NORM PARAMETERS: HCPCS | Performed by: STUDENT IN AN ORGANIZED HEALTH CARE EDUCATION/TRAINING PROGRAM

## 2024-10-22 PROCEDURE — 1036F TOBACCO NON-USER: CPT | Performed by: STUDENT IN AN ORGANIZED HEALTH CARE EDUCATION/TRAINING PROGRAM

## 2024-10-22 PROCEDURE — G8484 FLU IMMUNIZE NO ADMIN: HCPCS | Performed by: STUDENT IN AN ORGANIZED HEALTH CARE EDUCATION/TRAINING PROGRAM

## 2024-10-22 PROCEDURE — 3017F COLORECTAL CA SCREEN DOC REV: CPT | Performed by: STUDENT IN AN ORGANIZED HEALTH CARE EDUCATION/TRAINING PROGRAM

## 2024-10-22 PROCEDURE — G8427 DOCREV CUR MEDS BY ELIG CLIN: HCPCS | Performed by: STUDENT IN AN ORGANIZED HEALTH CARE EDUCATION/TRAINING PROGRAM

## 2024-10-22 PROCEDURE — 99203 OFFICE O/P NEW LOW 30 MIN: CPT | Performed by: STUDENT IN AN ORGANIZED HEALTH CARE EDUCATION/TRAINING PROGRAM

## 2024-10-22 NOTE — PROGRESS NOTES
no erythema  Psych: normal affect; mood stable  Neurologic: gait normal, sensation grossly intact in extremities  MSK:     Hip:  Left lower extremity  Skin intact  Compartment soft compressible  Neurovasc intact otherwise  Leg lengths equal  Full range of motion with internal/external rotation as well as flexion extension of the hip compared to the contralateral side  There is no evidence of groin pain with any type of range of motion of the hip  Mild tenderness palpation over the inguinal region with slight positive Valsalva  Tenderness palpation over left greater trochanteric bursa  5 out of 5 strength testing     IMAGING:    XR: AP pelvis, AP and Lateral of the involved hip display no acute fractures or dislocations.  Joint spaces are well-preserved.  Evidence of previous tubal ligation clip present over the left side of the abdomen.      ASSESSMENT  Left greater trochanteric bursitis  Possible left inguinal hernia    PLAN  I had a long discussion today with the patient regarding findings and treatment options.  With respect to her hip pain from an orthopedic standpoint this is likely coming from her greater trochanteric bursitis.  Will initiate physical therapy and see her back in roughly 8 weeks.  If this is not resolved plus or minus injection at that point time.  I recommend continuing with care with her OB/GYN and scheduled laparoscopic procedure.  Also would recommend discussion of possible inguinal hernia with her OB/GYN given her upcoming procedure.  Depending on her physicians recommendations possible referral to general surgery may be warranted.    Plan for follow-up in 8 weeks for repeat evaluation    E/M NEW Level 3- Greater than 30 minutes were spent with patient including history, exam, review of imaging (not including taking), discussion of diagnosis and treatment options, answering questions, and documentation.           Ko Villalobos DO   Orthopaedic Surgery   10/22/24  9:19 AM

## 2024-10-24 ENCOUNTER — TELEPHONE (OUTPATIENT)
Dept: OBGYN | Age: 54
End: 2024-10-24

## 2024-10-24 DIAGNOSIS — K40.90 UNILATERAL INGUINAL HERNIA WITHOUT OBSTRUCTION OR GANGRENE, RECURRENCE NOT SPECIFIED: ICD-10-CM

## 2024-10-24 DIAGNOSIS — T19.9XXA FOREIGN BODY IN GENITOURINARY TRACT, INITIAL ENCOUNTER: ICD-10-CM

## 2024-10-24 DIAGNOSIS — R10.2 CHRONIC PELVIC PAIN IN FEMALE: Primary | ICD-10-CM

## 2024-10-24 DIAGNOSIS — M25.552 LEFT HIP PAIN: ICD-10-CM

## 2024-10-24 DIAGNOSIS — G89.29 CHRONIC PELVIC PAIN IN FEMALE: Primary | ICD-10-CM

## 2024-10-24 NOTE — TELEPHONE ENCOUNTER
Patient saw orthopedic and he suspected patient had inguinal hernia but patient states that she was never told she had one.  Please look at testing and progress notes and advise if patient needs to see a general surgeon.

## 2024-11-18 ENCOUNTER — OFFICE VISIT (OUTPATIENT)
Dept: SURGERY | Age: 54
End: 2024-11-18
Payer: COMMERCIAL

## 2024-11-18 VITALS
RESPIRATION RATE: 16 BRPM | BODY MASS INDEX: 24.48 KG/M2 | HEIGHT: 62 IN | WEIGHT: 133 LBS | DIASTOLIC BLOOD PRESSURE: 73 MMHG | SYSTOLIC BLOOD PRESSURE: 116 MMHG | HEART RATE: 84 BPM

## 2024-11-18 DIAGNOSIS — R10.32 LEFT GROIN PAIN: Primary | ICD-10-CM

## 2024-11-18 PROCEDURE — G8427 DOCREV CUR MEDS BY ELIG CLIN: HCPCS | Performed by: SURGERY

## 2024-11-18 PROCEDURE — 1036F TOBACCO NON-USER: CPT | Performed by: SURGERY

## 2024-11-18 PROCEDURE — G8420 CALC BMI NORM PARAMETERS: HCPCS | Performed by: SURGERY

## 2024-11-18 PROCEDURE — 99203 OFFICE O/P NEW LOW 30 MIN: CPT | Performed by: SURGERY

## 2024-11-18 PROCEDURE — G8484 FLU IMMUNIZE NO ADMIN: HCPCS | Performed by: SURGERY

## 2024-11-18 PROCEDURE — 3017F COLORECTAL CA SCREEN DOC REV: CPT | Performed by: SURGERY

## 2024-11-18 NOTE — PROGRESS NOTES
Vaping Use    Vaping status: Never Used   Substance and Sexual Activity    Alcohol use: No    Drug use: No    Sexual activity: Yes     Partners: Male   Other Topics Concern    Not on file   Social History Narrative    Not on file     Social Determinants of Health     Financial Resource Strain: Low Risk  (7/15/2024)    Overall Financial Resource Strain (CARDIA)     Difficulty of Paying Living Expenses: Not hard at all   Food Insecurity: No Food Insecurity (7/15/2024)    Hunger Vital Sign     Worried About Running Out of Food in the Last Year: Never true     Ran Out of Food in the Last Year: Never true   Transportation Needs: Unknown (7/15/2024)    PRAPARE - Transportation     Lack of Transportation (Medical): Not on file     Lack of Transportation (Non-Medical): No   Physical Activity: Insufficiently Active (10/21/2024)    Exercise Vital Sign     Days of Exercise per Week: 3 days     Minutes of Exercise per Session: 40 min   Stress: Not on file   Social Connections: Not on file   Intimate Partner Violence: Not on file   Housing Stability: Unknown (7/15/2024)    Housing Stability Vital Sign     Unable to Pay for Housing in the Last Year: Not on file     Number of Places Lived in the Last Year: Not on file     Unstable Housing in the Last Year: No       Current Outpatient Medications   Medication Sig Dispense Refill    albuterol sulfate HFA (PROVENTIL;VENTOLIN;PROAIR) 108 (90 Base) MCG/ACT inhaler INHALE TWO PUFFS BY MOUTH EVERY 6 HOURS AS NEEDED FOR WHEEZING 18 g 5    fluticasone (FLOVENT HFA) 220 MCG/ACT inhaler Inhale 2 puffs into the lungs 2 times daily STOP prior prescription. New prescription reflects dose increase 3 each 5    fluconazole (DIFLUCAN) 150 MG tablet Take 1 tablet by mouth daily 3 tablet 0    escitalopram (LEXAPRO) 5 MG tablet Take 1 tablet by mouth daily 30 tablet 2     No current facility-administered medications for this visit.       Allergies   Allergen Reactions    Cat Hair Extract     Sulfa

## 2024-12-10 ENCOUNTER — OFFICE VISIT (OUTPATIENT)
Dept: PRIMARY CARE CLINIC | Age: 54
End: 2024-12-10
Payer: COMMERCIAL

## 2024-12-10 VITALS
RESPIRATION RATE: 16 BRPM | SYSTOLIC BLOOD PRESSURE: 105 MMHG | WEIGHT: 130 LBS | DIASTOLIC BLOOD PRESSURE: 68 MMHG | OXYGEN SATURATION: 98 % | BODY MASS INDEX: 23.92 KG/M2 | TEMPERATURE: 97.4 F | HEIGHT: 62 IN | HEART RATE: 90 BPM

## 2024-12-10 DIAGNOSIS — J01.40 ACUTE NON-RECURRENT PANSINUSITIS: Primary | ICD-10-CM

## 2024-12-10 PROCEDURE — 99213 OFFICE O/P EST LOW 20 MIN: CPT | Performed by: NURSE PRACTITIONER

## 2024-12-10 PROCEDURE — G8420 CALC BMI NORM PARAMETERS: HCPCS | Performed by: NURSE PRACTITIONER

## 2024-12-10 PROCEDURE — G8484 FLU IMMUNIZE NO ADMIN: HCPCS | Performed by: NURSE PRACTITIONER

## 2024-12-10 PROCEDURE — 3017F COLORECTAL CA SCREEN DOC REV: CPT | Performed by: NURSE PRACTITIONER

## 2024-12-10 PROCEDURE — 1036F TOBACCO NON-USER: CPT | Performed by: NURSE PRACTITIONER

## 2024-12-10 PROCEDURE — G8427 DOCREV CUR MEDS BY ELIG CLIN: HCPCS | Performed by: NURSE PRACTITIONER

## 2024-12-10 RX ORDER — FLUTICASONE PROPIONATE 50 MCG
1 SPRAY, SUSPENSION (ML) NASAL 2 TIMES DAILY
Qty: 16 G | Refills: 0 | Status: SHIPPED | OUTPATIENT
Start: 2024-12-10

## 2024-12-10 RX ORDER — FLUCONAZOLE 150 MG/1
TABLET ORAL
Qty: 2 TABLET | Refills: 0 | Status: SHIPPED | OUTPATIENT
Start: 2024-12-10

## 2024-12-10 NOTE — PROGRESS NOTES
rash.  Neurological:  Alert and oriented.  Motor functions intact.  Responds to verbal commands.     Test Results Section   (All laboratory and radiology results have been personally reviewed by myself)  Labs:  No results found for this visit on 12/10/24.    Medical Decision Making   Script written for Augmentin, Flonase and Diflucan, side effects discussed. Increase fluids and rest. Symptomatic relief discussed. F/u PCP in 5-7 days if symptoms persist. ED sooner if symptoms worsen or change. Red flag symptoms discussed. Patient verbalized understanding and is in agreement with this care plan. All questions answered.    Assessment / Plan   Impression(s):  Angeli was seen today for cough.    Diagnoses and all orders for this visit:    Acute non-recurrent pansinusitis  -     amoxicillin-clavulanate (AUGMENTIN) 875-125 MG per tablet; Take 1 tablet by mouth 2 times daily for 10 days  -     fluticasone (FLONASE) 50 MCG/ACT nasal spray; 1 spray by Each Nostril route in the morning and at bedtime  -     fluconazole (DIFLUCAN) 150 MG tablet; 1 tab po x 1 dose, may repeat in 72 hrs if still symptomatic    Return if symptoms worsen or fail to improve.    Electronically signed by BAILEY Alexis CNP   DD: 12/10/24    **This report was transcribed using voice recognition software. Every effort was made to ensure accuracy; however, inadvertent computerized transcription errors may be present.

## 2024-12-12 ENCOUNTER — EVALUATION (OUTPATIENT)
Dept: PHYSICAL THERAPY | Age: 54
End: 2024-12-12

## 2024-12-12 DIAGNOSIS — M70.62 GREATER TROCHANTERIC BURSITIS OF LEFT HIP: Primary | ICD-10-CM

## 2024-12-12 NOTE — PROGRESS NOTES
[facility]          Phone: [phone] Fax: [phone]    Physical Therapy Daily Treatment Note  Date:  2024    Patient Name:  Angeli Livingston    :  1970  MRN: [id number]    Evaluating Therapist:  Moriah Benton, PT [id number]    Restrictions/Precautions:    Diagnosis:     Diagnosis Orders   1. Greater trochanteric bursitis of left hip          Treatment Diagnosis:    Insurance/Certification information:  [facility]  Referring Physician:  Ko Villalobos DO  Plan of care signed (Y/N):    Visit# / total visits:    Pain level: 0/10   Time In:  1110  Time Out:  1145    Subjective:  see evaluation     Exercises:  Exercise/Equipment Resistance/Repetitions Other comments     Piriformis stretch 30 sec x 2 reps      IT band stretch with strap 30 sec x 2 reps      Hamstring stretch                                                                                                                          Other Therapeutic Activities:  PT evaluation completed    Home Exercise Program:  24 - piriformis stretch, IT band stretch with strap    Manual Treatments:  N/A    Modalities:  N/A     Time-in Time-out Total Time   05819  Evaluation Low Complexity 1110 1135 25   98528  Evaluation Med Complexity      73201  Evaluation High Complexity      31428  Ther Ex 1135 1145 10   06025  Neuro Re-ed        68848  Ther Activities        88102  Manual Therapy       93535  E-stim       64939  Ultrasound            Session 1110 1145 35       Treatment/Activity Tolerance:  [x] Patient tolerated treatment well [] Patient limited by fatigue  [] Patient limited by pain  [] Patient limited by other medical complications  [] Other:     Prognosis: [x] Good [] Fair  [] Poor    Patient Requires Follow-up: [x] Yes  [] No    Plan:   [] Continue per plan of care [] Alter current plan (see comments)  [x] Plan of care initiated [] Hold pending MD visit [] Discharge  Plan for Next Session:

## 2024-12-12 NOTE — PROGRESS NOTES
[facility]   Phone: [phone]   Fax: [phone]           Date:  2024   Patient: Angeli Livingston  : 1970  MRN: [id number]  Referring Provider: Ko Villalobos DO  [id number] [facility]  [address] [place] [address]     Medical Diagnosis:      Diagnosis Orders   1. Greater trochanteric bursitis of left hip            SUBJECTIVE:     Onset date:     Onset: Unknown    Mechanism of Injury: Pt reports h/o tubal ligation 16 years ago, states that clip on the left side has migrated and has been causing her pain. She states that she has been having pain in the left lower flank, groin, and hip for the past 10 years. She reports that she is scheduled to have the clip removed on , but in the course of evaluating her pain, she was referred to an orthopedic doctor who discovered pain when pushing on the left greater trochanter. Pt also reports left sided low back pain.    Previous PT: none    Medical Management for Current Problem:  N/A    Chief complaint: pain, poor sleep quality     Behavior: condition is getting worse    Pain: intermittent  Current: 0/10     Best: 0/10     Worst:6/10    Symptom Type/Quality: N/A  Location:: Hip: left lumbar spine and left hip          Provoking Activities/Positions: lying on left side                 Relieving Activitie/Positions:  N/A    Disturbed Sleep: yes     Imaging results: No results found.    Past Medical History:  Past Medical History:   Diagnosis Date    Abnormal Pap smear of cervix     LEEP    Asthma     diagnosed at age 17    Thyroid disease     Trigeminal neuralgia      Past Surgical History:   Procedure Laterality Date     SECTION      x1    DILATION AND CURETTAGE      LAPAROSCOPY      filshie clip removed from pelvis, sitting on bladder    LAPAROTOMY  2008    uterine rupture repair, filshie clips at time of surgery    LEEP      TONSILLECTOMY      TUBAL LIGATION         Medications:   Current Outpatient

## 2024-12-27 ENCOUNTER — TREATMENT (OUTPATIENT)
Dept: PHYSICAL THERAPY | Age: 54
End: 2024-12-27

## 2024-12-27 DIAGNOSIS — M70.62 GREATER TROCHANTERIC BURSITIS OF LEFT HIP: Primary | ICD-10-CM

## 2024-12-27 NOTE — PROGRESS NOTES
[facility]          Phone: [phone] Fax: [phone]    Physical Therapy Daily Treatment Note  Date:  2024    Patient Name:  Angeli Livingston    :  1970  MRN: [id number]    Evaluating Therapist:  Moriah Benton, PT [id number]    Restrictions/Precautions:    Diagnosis:     Diagnosis Orders   1. Greater trochanteric bursitis of left hip          Treatment Diagnosis:    Insurance/Certification information:  [facility]  Referring Physician:  Ko Villalobos DO  Plan of care signed (Y/N):    Visit# / total visits:  2/3-4  Pain level: 0/10   Time In:  1259  Time Out:  1322    Subjective:   Pt reports she thinks her hip is feeling better. States she is able to cross her legs without any discomfort.    Exercises:  Exercise/Equipment Resistance/Repetitions Other comments     Piriformis stretch 30 sec x 2 reps      IT band stretch with strap 30 sec x 2 reps      Hamstring stretch 30 sec x 2 reps      Quad/hip flexor stretch 30 sec x 2 reps                                                                                                                  Other Therapeutic Activities:  Reviewed HEP provided last visit. Pt able to demonstrate good technique with exercises. Instructed pt in hamstring and quad stretches to add to HEP.    Pt able to tolerate palpation of left greater trochanter today without significant c/o pain.    Home Exercise Program:  24 - piriformis stretch, IT band stretch with strap; 24 - hamstring stretch and quad/hip flexor stretch with strap    Manual Treatments:  N/A    Modalities:  N/A     Time-in Time-out Total Time   33042  Evaluation Low Complexity      08036  Evaluation Med Complexity      17026  Evaluation High Complexity      86449  Ther Ex 1259 1322 23   15875  Neuro Re-ed        94978  Ther Activities        02607  Manual Therapy       85186  E-stim       23014  Ultrasound            Session 1259 1322 23       Treatment/Activity

## 2025-01-15 ENCOUNTER — TELEPHONE (OUTPATIENT)
Dept: OBGYN | Age: 55
End: 2025-01-15

## 2025-01-15 NOTE — TELEPHONE ENCOUNTER
Received a message from unknown name and message cut off. No number to return call to. It is from the prior auth department something regarding CPT code 00587 for the surgery scheduled on 1/28/25

## 2025-01-15 NOTE — TELEPHONE ENCOUNTER
Im not sure who that would be. There is no Prior Auth needed. Its scanned under media and attached to the surgery date of 1/28.

## 2025-01-17 ENCOUNTER — ANCILLARY ORDERS (OUTPATIENT)
Dept: OBGYN | Age: 55
End: 2025-01-17

## 2025-01-17 ENCOUNTER — HOSPITAL ENCOUNTER (OUTPATIENT)
Dept: GENERAL RADIOLOGY | Age: 55
Discharge: HOME | End: 2025-01-19
Payer: COMMERCIAL

## 2025-01-17 VITALS — WEIGHT: 133 LBS | BODY MASS INDEX: 24.33 KG/M2

## 2025-01-17 DIAGNOSIS — R92.30 DENSE BREAST TISSUE ON MAMMOGRAM, UNSPECIFIED TYPE: ICD-10-CM

## 2025-01-17 DIAGNOSIS — R10.2 CHRONIC PELVIC PAIN IN FEMALE: Primary | ICD-10-CM

## 2025-01-17 DIAGNOSIS — Z12.31 SCREENING MAMMOGRAM, ENCOUNTER FOR: ICD-10-CM

## 2025-01-17 DIAGNOSIS — T19.9XXA FOREIGN BODY IN GENITOURINARY TRACT, INITIAL ENCOUNTER: ICD-10-CM

## 2025-01-17 DIAGNOSIS — M25.552 LEFT HIP PAIN: ICD-10-CM

## 2025-01-17 DIAGNOSIS — G89.29 CHRONIC PELVIC PAIN IN FEMALE: Primary | ICD-10-CM

## 2025-01-17 PROCEDURE — 77063 BREAST TOMOSYNTHESIS BI: CPT

## 2025-01-20 NOTE — TELEPHONE ENCOUNTER
It came up in my open encounters today and I showed Car. It was sent to Jessi and she is working on it. No worries.

## 2025-01-22 ENCOUNTER — OFFICE VISIT (OUTPATIENT)
Dept: OBGYN | Age: 55
End: 2025-01-22
Payer: COMMERCIAL

## 2025-01-22 VITALS
DIASTOLIC BLOOD PRESSURE: 59 MMHG | HEIGHT: 62 IN | HEART RATE: 87 BPM | BODY MASS INDEX: 25.58 KG/M2 | SYSTOLIC BLOOD PRESSURE: 116 MMHG | WEIGHT: 139 LBS

## 2025-01-22 DIAGNOSIS — R10.2 CHRONIC PELVIC PAIN IN FEMALE: Primary | ICD-10-CM

## 2025-01-22 DIAGNOSIS — T19.9XXA FOREIGN BODY IN GENITOURINARY TRACT, INITIAL ENCOUNTER: ICD-10-CM

## 2025-01-22 DIAGNOSIS — G89.29 CHRONIC PELVIC PAIN IN FEMALE: Primary | ICD-10-CM

## 2025-01-22 PROCEDURE — 99213 OFFICE O/P EST LOW 20 MIN: CPT | Performed by: OBSTETRICS & GYNECOLOGY

## 2025-01-22 PROCEDURE — 3017F COLORECTAL CA SCREEN DOC REV: CPT | Performed by: OBSTETRICS & GYNECOLOGY

## 2025-01-22 PROCEDURE — G8419 CALC BMI OUT NRM PARAM NOF/U: HCPCS | Performed by: OBSTETRICS & GYNECOLOGY

## 2025-01-22 PROCEDURE — 1036F TOBACCO NON-USER: CPT | Performed by: OBSTETRICS & GYNECOLOGY

## 2025-01-22 PROCEDURE — G8427 DOCREV CUR MEDS BY ELIG CLIN: HCPCS | Performed by: OBSTETRICS & GYNECOLOGY

## 2025-01-22 SDOH — ECONOMIC STABILITY: FOOD INSECURITY: WITHIN THE PAST 12 MONTHS, YOU WORRIED THAT YOUR FOOD WOULD RUN OUT BEFORE YOU GOT MONEY TO BUY MORE.: NEVER TRUE

## 2025-01-22 SDOH — ECONOMIC STABILITY: FOOD INSECURITY: WITHIN THE PAST 12 MONTHS, THE FOOD YOU BOUGHT JUST DIDN'T LAST AND YOU DIDN'T HAVE MONEY TO GET MORE.: NEVER TRUE

## 2025-01-22 ASSESSMENT — PATIENT HEALTH QUESTIONNAIRE - PHQ9
7. TROUBLE CONCENTRATING ON THINGS, SUCH AS READING THE NEWSPAPER OR WATCHING TELEVISION: NOT AT ALL
6. FEELING BAD ABOUT YOURSELF - OR THAT YOU ARE A FAILURE OR HAVE LET YOURSELF OR YOUR FAMILY DOWN: NOT AT ALL
2. FEELING DOWN, DEPRESSED OR HOPELESS: NOT AT ALL
SUM OF ALL RESPONSES TO PHQ QUESTIONS 1-9: 0
8. MOVING OR SPEAKING SO SLOWLY THAT OTHER PEOPLE COULD HAVE NOTICED. OR THE OPPOSITE, BEING SO FIGETY OR RESTLESS THAT YOU HAVE BEEN MOVING AROUND A LOT MORE THAN USUAL: NOT AT ALL
5. POOR APPETITE OR OVEREATING: NOT AT ALL
SUM OF ALL RESPONSES TO PHQ QUESTIONS 1-9: 0
SUM OF ALL RESPONSES TO PHQ QUESTIONS 1-9: 0
1. LITTLE INTEREST OR PLEASURE IN DOING THINGS: NOT AT ALL
SUM OF ALL RESPONSES TO PHQ QUESTIONS 1-9: 0
SUM OF ALL RESPONSES TO PHQ9 QUESTIONS 1 & 2: 0
10. IF YOU CHECKED OFF ANY PROBLEMS, HOW DIFFICULT HAVE THESE PROBLEMS MADE IT FOR YOU TO DO YOUR WORK, TAKE CARE OF THINGS AT HOME, OR GET ALONG WITH OTHER PEOPLE: NOT DIFFICULT AT ALL
3. TROUBLE FALLING OR STAYING ASLEEP: NOT AT ALL
9. THOUGHTS THAT YOU WOULD BE BETTER OFF DEAD, OR OF HURTING YOURSELF: NOT AT ALL
4. FEELING TIRED OR HAVING LITTLE ENERGY: NOT AT ALL

## 2025-01-22 NOTE — TELEPHONE ENCOUNTER
Per Fartun[facility] the second CPT code is pending approval.

## 2025-01-22 NOTE — PROGRESS NOTES
Patient alert and pleasant with no new complaints  Here today for pre-op appt.  [facility] and removal or foreign body 1/28/25 09[facility]  All pre-op information given to patient.  Date,time and place.   Patient voiced understanding.  Discharge instructions have been discussed with the patient. Patient advised to call our office with any questions or concerns.   Voiced understanding.

## 2025-01-22 NOTE — PROGRESS NOTES
Department of Gynecology   History and Physical        HISTORY OF PRESENT ILLNESS:                     The patient is a 54 y.o. female  who presents with complaint of pelvic pain and retained filshie clip. CT shows clip in left adnexa. Pt is requesting removal.        Past Medical History:        Diagnosis Date    Abnormal Pap smear of cervix     LEEP    Asthma     diagnosed at age 17    Thyroid disease     Trigeminal neuralgia      Past Surgical History:        Procedure Laterality Date     SECTION      x1    DILATION AND CURETTAGE      LAPAROSCOPY  2017    filshie clip removed from pelvis, sitting on bladder    LAPAROTOMY      uterine rupture repair, filshie clips at time of surgery    LEEP      TONSILLECTOMY      TUBAL LIGATION           OB History    Para Term  AB Living   5 5 5         SAB IAB Ectopic Molar Multiple Live Births             5      # Outcome Date GA Lbr Boo/2nd Weight Sex Type Anes PTL Lv   5 Term      Vag-Spont         Complications: Uterine Rupture   4 Term      Vag-Spont      3 Term      Vag-Spont      2 Term      CS-LTranv      1 Term      Vag-Spont          Medications Prior to Admission:   Not in a hospital admission.    Allergies:  Cat dander and Sulfa antibiotics     Social History:  Social History     Socioeconomic History    Marital status:    Occupational History    Occupation: office work [employer]   Tobacco Use    Smoking status: Former     Current packs/day: 0.00     Average packs/day: 0.5 packs/day for 3.0 years (1.5 ttl pk-yrs)     Types: Cigarettes     Start date: 6/10/1985     Quit date: 6/10/1988     Years since quittin.6    Smokeless tobacco: Never   Vaping Use    Vaping status: Never Used   Substance and Sexual Activity    Alcohol use: No    Drug use: No    Sexual activity: Yes     Partners: Male     Social Determinants of Health     Financial Resource Strain: Low Risk  (7/15/2024)    Overall

## 2025-01-23 NOTE — PROGRESS NOTES
[facility] PRE-ADMISSION TESTING INSTRUCTIONS    The Preadmission Testing patient is instructed accordingly using the following criteria (check applicable):    ARRIVAL INSTRUCTIONS:  [x] Parking the day of Surgery is located in the Main Entrance lot.  Upon entering the door, make an immediate right to the surgery reception desk    [x] Bring photo ID and insurance card    [] Bring in a copy of Living will or Durable Power of  papers.    [x] Please be sure to arrange for responsible adult to provide transportation to and from the hospital    [x] Please arrange for responsible adult to be with you for the 24 hour period post procedure due to having anesthesia    [x] If you awake am of surgery not feeling well or have temperature >100 please call [phone]    GENERAL INSTRUCTIONS:    [x] May have clear liquids until 4 hours prior to surgery. Examples include water, fruit juices (no pulp), jello, popsicles, black coffee or tea, beef or chicken broth.               No gum, candy or mints.    [x] You may brush your teeth, but do not swallow any water    [] Take medications as instructed with 1-2 oz of water    [] Stop herbal supplements and vitamins 5 days prior to procedure    [] Follow preop dosing of blood thinners per physician instructions    [] Take 1/2 dose of evening insulin, but no insulin after midnight    [] No oral diabetic medications after midnight    [] If diabetic and have low blood sugar or feel symptomatic, take 1-2oz apple juice only    [x] Bring inhalers day of surgery    [] Bring C-PAP/ Bi-Pap day of surgery    [x] Bring urine specimen day of surgery    [x] Shower or bath with soap, lather and rinse well, AM of Surgery, no lotion, powders or creams to surgical site    [] Follow bowel prep as instructed per surgeon    [x] No tobacco products within 24 hours of surgery     [x] No alcohol or illegal drug use within 24 hours of surgery.    [x] Jewelry, body piercing's,

## 2025-01-23 NOTE — TELEPHONE ENCOUNTER
1/21/25 Spoke with Jenn @ [facility], [id number] does not require prior authorization however [id number] did require approval.  Prior authorization will be determined by 1/30/24.

## 2025-01-24 ENCOUNTER — HOSPITAL ENCOUNTER (OUTPATIENT)
Dept: PREADMISSION TESTING | Age: 55
Discharge: HOME | End: 2025-01-24
Payer: COMMERCIAL

## 2025-01-24 DIAGNOSIS — R10.2 CHRONIC PELVIC PAIN IN FEMALE: ICD-10-CM

## 2025-01-24 DIAGNOSIS — G89.29 CHRONIC PELVIC PAIN IN FEMALE: ICD-10-CM

## 2025-01-24 LAB
ABO + RH BLD: NORMAL
ARM BAND NUMBER: NORMAL
BLOOD BANK SAMPLE EXPIRATION: NORMAL
BLOOD GROUP ANTIBODIES SERPL: NEGATIVE
HCT VFR BLD AUTO: 35 % (ref 34–48)
HGB BLD-MCNC: 11.3 G/DL (ref 11.5–15.5)

## 2025-01-24 PROCEDURE — 86850 RBC ANTIBODY SCREEN: CPT

## 2025-01-24 PROCEDURE — 85014 HEMATOCRIT: CPT

## 2025-01-24 PROCEDURE — 86900 BLOOD TYPING SEROLOGIC ABO: CPT

## 2025-01-24 PROCEDURE — 86901 BLOOD TYPING SEROLOGIC RH(D): CPT

## 2025-01-24 PROCEDURE — 36415 COLL VENOUS BLD VENIPUNCTURE: CPT

## 2025-01-24 PROCEDURE — 85018 HEMOGLOBIN: CPT

## 2025-01-24 NOTE — TELEPHONE ENCOUNTER
Janelle from [facility] just called again and was questioning if the auth is not obtained, does the patient need to move forward with the surgery or will it be rescheduled. I gave her the [place] office number to call since Dr. Johnston would likely be there first and you guys would know better than I.

## 2025-01-27 ENCOUNTER — ANESTHESIA EVENT (OUTPATIENT)
Dept: OPERATING ROOM | Age: 55
End: 2025-01-27
Payer: COMMERCIAL

## 2025-01-28 ENCOUNTER — ANESTHESIA (OUTPATIENT)
Dept: OPERATING ROOM | Age: 55
End: 2025-01-28
Payer: COMMERCIAL

## 2025-01-28 ENCOUNTER — HOSPITAL ENCOUNTER (OUTPATIENT)
Age: 55
Setting detail: OUTPATIENT SURGERY
Discharge: HOME | End: 2025-01-28
Attending: OBSTETRICS & GYNECOLOGY | Admitting: OBSTETRICS & GYNECOLOGY
Payer: COMMERCIAL

## 2025-01-28 VITALS
RESPIRATION RATE: 16 BRPM | HEART RATE: 68 BPM | OXYGEN SATURATION: 99 % | HEIGHT: 62 IN | TEMPERATURE: 97 F | SYSTOLIC BLOOD PRESSURE: 122 MMHG | DIASTOLIC BLOOD PRESSURE: 76 MMHG | WEIGHT: 139 LBS | BODY MASS INDEX: 25.58 KG/M2

## 2025-01-28 DIAGNOSIS — G89.29 CHRONIC PELVIC PAIN IN FEMALE: Primary | ICD-10-CM

## 2025-01-28 DIAGNOSIS — R10.2 CHRONIC PELVIC PAIN IN FEMALE: Primary | ICD-10-CM

## 2025-01-28 LAB
HCG, URINE, POC: NEGATIVE
Lab: NORMAL
NEGATIVE QC PASS/FAIL: NORMAL
POSITIVE QC PASS/FAIL: NORMAL

## 2025-01-28 PROCEDURE — 2500000003 HC RX 250 WO HCPCS

## 2025-01-28 PROCEDURE — 3700000001 HC ADD 15 MINUTES (ANESTHESIA): Performed by: OBSTETRICS & GYNECOLOGY

## 2025-01-28 PROCEDURE — 6360000002 HC RX W HCPCS

## 2025-01-28 PROCEDURE — 3600000003 HC SURGERY LEVEL 3 BASE: Performed by: OBSTETRICS & GYNECOLOGY

## 2025-01-28 PROCEDURE — 7100000010 HC PHASE II RECOVERY - FIRST 15 MIN: Performed by: OBSTETRICS & GYNECOLOGY

## 2025-01-28 PROCEDURE — 6360000002 HC RX W HCPCS: Performed by: ANESTHESIOLOGY

## 2025-01-28 PROCEDURE — 2720000010 HC SURG SUPPLY STERILE: Performed by: OBSTETRICS & GYNECOLOGY

## 2025-01-28 PROCEDURE — 7100000001 HC PACU RECOVERY - ADDTL 15 MIN: Performed by: OBSTETRICS & GYNECOLOGY

## 2025-01-28 PROCEDURE — 2709999900 HC NON-CHARGEABLE SUPPLY: Performed by: OBSTETRICS & GYNECOLOGY

## 2025-01-28 PROCEDURE — 3600000013 HC SURGERY LEVEL 3 ADDTL 15MIN: Performed by: OBSTETRICS & GYNECOLOGY

## 2025-01-28 PROCEDURE — 2580000003 HC RX 258

## 2025-01-28 PROCEDURE — 7100000011 HC PHASE II RECOVERY - ADDTL 15 MIN: Performed by: OBSTETRICS & GYNECOLOGY

## 2025-01-28 PROCEDURE — 3700000000 HC ANESTHESIA ATTENDED CARE: Performed by: OBSTETRICS & GYNECOLOGY

## 2025-01-28 PROCEDURE — 7100000000 HC PACU RECOVERY - FIRST 15 MIN: Performed by: OBSTETRICS & GYNECOLOGY

## 2025-01-28 PROCEDURE — 88305 TISSUE EXAM BY PATHOLOGIST: CPT

## 2025-01-28 RX ORDER — SODIUM CHLORIDE 0.9 % (FLUSH) 0.9 %
5-40 SYRINGE (ML) INJECTION EVERY 12 HOURS SCHEDULED
Status: DISCONTINUED | OUTPATIENT
Start: 2025-01-28 | End: 2025-01-28 | Stop reason: HOSPADM

## 2025-01-28 RX ORDER — PROPOFOL 10 MG/ML
INJECTION, EMULSION INTRAVENOUS
Status: DISCONTINUED | OUTPATIENT
Start: 2025-01-28 | End: 2025-01-28 | Stop reason: SDUPTHER

## 2025-01-28 RX ORDER — DIPHENHYDRAMINE HYDROCHLORIDE 50 MG/ML
12.5 INJECTION INTRAMUSCULAR; INTRAVENOUS
Status: DISCONTINUED | OUTPATIENT
Start: 2025-01-28 | End: 2025-01-28 | Stop reason: HOSPADM

## 2025-01-28 RX ORDER — MEPERIDINE HYDROCHLORIDE 25 MG/ML
12.5 INJECTION INTRAMUSCULAR; INTRAVENOUS; SUBCUTANEOUS EVERY 5 MIN PRN
Status: DISCONTINUED | OUTPATIENT
Start: 2025-01-28 | End: 2025-01-28 | Stop reason: HOSPADM

## 2025-01-28 RX ORDER — SODIUM CHLORIDE 9 MG/ML
INJECTION, SOLUTION INTRAVENOUS
Status: DISCONTINUED | OUTPATIENT
Start: 2025-01-28 | End: 2025-01-28 | Stop reason: SDUPTHER

## 2025-01-28 RX ORDER — SODIUM CHLORIDE 9 MG/ML
INJECTION, SOLUTION INTRAVENOUS PRN
Status: DISCONTINUED | OUTPATIENT
Start: 2025-01-28 | End: 2025-01-28 | Stop reason: HOSPADM

## 2025-01-28 RX ORDER — OXYCODONE HYDROCHLORIDE 5 MG/1
5 TABLET ORAL PRN
Status: DISCONTINUED | OUTPATIENT
Start: 2025-01-28 | End: 2025-01-28 | Stop reason: HOSPADM

## 2025-01-28 RX ORDER — ONDANSETRON 2 MG/ML
INJECTION INTRAMUSCULAR; INTRAVENOUS
Status: DISCONTINUED | OUTPATIENT
Start: 2025-01-28 | End: 2025-01-28 | Stop reason: SDUPTHER

## 2025-01-28 RX ORDER — OXYCODONE HYDROCHLORIDE 5 MG/1
10 TABLET ORAL PRN
Status: DISCONTINUED | OUTPATIENT
Start: 2025-01-28 | End: 2025-01-28 | Stop reason: HOSPADM

## 2025-01-28 RX ORDER — ROCURONIUM BROMIDE 10 MG/ML
INJECTION, SOLUTION INTRAVENOUS
Status: DISCONTINUED | OUTPATIENT
Start: 2025-01-28 | End: 2025-01-28 | Stop reason: SDUPTHER

## 2025-01-28 RX ORDER — DIPHENHYDRAMINE HYDROCHLORIDE 50 MG/ML
INJECTION INTRAMUSCULAR; INTRAVENOUS
Status: DISCONTINUED | OUTPATIENT
Start: 2025-01-28 | End: 2025-01-28 | Stop reason: SDUPTHER

## 2025-01-28 RX ORDER — FENTANYL CITRATE 50 UG/ML
INJECTION, SOLUTION INTRAMUSCULAR; INTRAVENOUS
Status: DISCONTINUED | OUTPATIENT
Start: 2025-01-28 | End: 2025-01-28 | Stop reason: SDUPTHER

## 2025-01-28 RX ORDER — DEXAMETHASONE SODIUM PHOSPHATE 4 MG/ML
INJECTION, SOLUTION INTRA-ARTICULAR; INTRALESIONAL; INTRAMUSCULAR; INTRAVENOUS; SOFT TISSUE
Status: DISCONTINUED | OUTPATIENT
Start: 2025-01-28 | End: 2025-01-28 | Stop reason: SDUPTHER

## 2025-01-28 RX ORDER — LIDOCAINE HYDROCHLORIDE ANHYDROUS AND DEXTROSE MONOHYDRATE 5; 400 G/100ML; MG/100ML
2 INJECTION, SOLUTION INTRAVENOUS CONTINUOUS
Status: DISCONTINUED | OUTPATIENT
Start: 2025-01-28 | End: 2025-01-28 | Stop reason: HOSPADM

## 2025-01-28 RX ORDER — DEXMEDETOMIDINE HYDROCHLORIDE 100 UG/ML
INJECTION, SOLUTION INTRAVENOUS
Status: DISCONTINUED | OUTPATIENT
Start: 2025-01-28 | End: 2025-01-28 | Stop reason: SDUPTHER

## 2025-01-28 RX ORDER — MIDAZOLAM HYDROCHLORIDE 1 MG/ML
INJECTION, SOLUTION INTRAMUSCULAR; INTRAVENOUS
Status: DISCONTINUED | OUTPATIENT
Start: 2025-01-28 | End: 2025-01-28 | Stop reason: SDUPTHER

## 2025-01-28 RX ORDER — SODIUM CHLORIDE 0.9 % (FLUSH) 0.9 %
5-40 SYRINGE (ML) INJECTION PRN
Status: DISCONTINUED | OUTPATIENT
Start: 2025-01-28 | End: 2025-01-28 | Stop reason: HOSPADM

## 2025-01-28 RX ORDER — LIDOCAINE HYDROCHLORIDE 20 MG/ML
INJECTION, SOLUTION EPIDURAL; INFILTRATION; INTRACAUDAL; PERINEURAL
Status: DISCONTINUED | OUTPATIENT
Start: 2025-01-28 | End: 2025-01-28 | Stop reason: SDUPTHER

## 2025-01-28 RX ORDER — SODIUM CHLORIDE, SODIUM LACTATE, POTASSIUM CHLORIDE, CALCIUM CHLORIDE 600; 310; 30; 20 MG/100ML; MG/100ML; MG/100ML; MG/100ML
INJECTION, SOLUTION INTRAVENOUS CONTINUOUS
Status: DISCONTINUED | OUTPATIENT
Start: 2025-01-28 | End: 2025-01-28 | Stop reason: HOSPADM

## 2025-01-28 RX ORDER — NALOXONE HYDROCHLORIDE 0.4 MG/ML
INJECTION, SOLUTION INTRAMUSCULAR; INTRAVENOUS; SUBCUTANEOUS PRN
Status: DISCONTINUED | OUTPATIENT
Start: 2025-01-28 | End: 2025-01-28 | Stop reason: HOSPADM

## 2025-01-28 RX ADMIN — DEXMEDETOMIDINE HCL 6 MCG: 100 INJECTION INTRAVENOUS at 07:42

## 2025-01-28 RX ADMIN — DIPHENHYDRAMINE HYDROCHLORIDE 12.5 MG: 50 INJECTION INTRAMUSCULAR; INTRAVENOUS at 07:48

## 2025-01-28 RX ADMIN — Medication 20 MG: at 07:07

## 2025-01-28 RX ADMIN — DEXAMETHASONE SODIUM PHOSPHATE 8 MG: 4 INJECTION, SOLUTION INTRAMUSCULAR; INTRAVENOUS at 07:07

## 2025-01-28 RX ADMIN — MIDAZOLAM 2 MG: 1 INJECTION INTRAMUSCULAR; INTRAVENOUS at 06:57

## 2025-01-28 RX ADMIN — SODIUM CHLORIDE: 9 INJECTION, SOLUTION INTRAVENOUS at 06:56

## 2025-01-28 RX ADMIN — ROCURONIUM BROMIDE 40 MG: 10 INJECTION, SOLUTION INTRAVENOUS at 07:07

## 2025-01-28 RX ADMIN — ROCURONIUM BROMIDE 10 MG: 10 INJECTION, SOLUTION INTRAVENOUS at 07:34

## 2025-01-28 RX ADMIN — LIDOCAINE HYDROCHLORIDE 2 MG/KG/HR: 4 INJECTION, SOLUTION INTRAVENOUS at 06:58

## 2025-01-28 RX ADMIN — FENTANYL CITRATE 50 MCG: 50 INJECTION, SOLUTION INTRAMUSCULAR; INTRAVENOUS at 07:07

## 2025-01-28 RX ADMIN — DEXMEDETOMIDINE HCL 4 MCG: 100 INJECTION INTRAVENOUS at 07:54

## 2025-01-28 RX ADMIN — LIDOCAINE HYDROCHLORIDE 60 MG: 20 INJECTION, SOLUTION EPIDURAL; INFILTRATION; INTRACAUDAL; PERINEURAL at 07:07

## 2025-01-28 RX ADMIN — SUGAMMADEX 200 MG: 100 INJECTION, SOLUTION INTRAVENOUS at 08:01

## 2025-01-28 RX ADMIN — ONDANSETRON 4 MG: 2 INJECTION, SOLUTION INTRAMUSCULAR; INTRAVENOUS at 07:56

## 2025-01-28 RX ADMIN — FENTANYL CITRATE 50 MCG: 50 INJECTION, SOLUTION INTRAMUSCULAR; INTRAVENOUS at 07:22

## 2025-01-28 RX ADMIN — HYDROMORPHONE HYDROCHLORIDE 0.25 MG: 1 INJECTION, SOLUTION INTRAMUSCULAR; INTRAVENOUS; SUBCUTANEOUS at 08:42

## 2025-01-28 RX ADMIN — PROPOFOL 120 MG: 10 INJECTION, EMULSION INTRAVENOUS at 07:07

## 2025-01-28 ASSESSMENT — PAIN DESCRIPTION - PAIN TYPE
TYPE: SURGICAL PAIN
TYPE: SURGICAL PAIN

## 2025-01-28 ASSESSMENT — PAIN DESCRIPTION - LOCATION
LOCATION: ABDOMEN
LOCATION: ABDOMEN

## 2025-01-28 ASSESSMENT — PAIN SCALES - GENERAL
PAINLEVEL_OUTOF10: 5
PAINLEVEL_OUTOF10: 0
PAINLEVEL_OUTOF10: 3

## 2025-01-28 ASSESSMENT — PAIN - FUNCTIONAL ASSESSMENT
PAIN_FUNCTIONAL_ASSESSMENT: ADULT NONVERBAL PAIN SCALE (NPVS)
PAIN_FUNCTIONAL_ASSESSMENT: 0-10

## 2025-01-28 ASSESSMENT — PAIN DESCRIPTION - DESCRIPTORS
DESCRIPTORS: SORE
DESCRIPTORS: SORE

## 2025-01-28 ASSESSMENT — PAIN DESCRIPTION - FREQUENCY
FREQUENCY: CONTINUOUS
FREQUENCY: CONTINUOUS

## 2025-01-28 NOTE — ANESTHESIA PRE PROCEDURE
Department of Anesthesiology  Preprocedure Note       Name:  Angeli Livingston   Age:  54 y.o.  :  1970                                          MRN:  [id number]         Date:  2025      Surgeon: Surgeon(s):  Shantel Johnston DO    Procedure: Procedure(s):  DIAGNOSTIC LAPAROSCOPY REMOVAL OF FOREIGN BODY    Medications prior to admission:   Prior to Admission medications    Medication Sig Start Date End Date Taking? Authorizing Provider   albuterol sulfate HFA (PROVENTIL;VENTOLIN;PROAIR) 108 (90 Base) MCG/ACT inhaler INHALE TWO PUFFS BY MOUTH EVERY 6 HOURS AS NEEDED FOR WHEEZING 3/4/24  Yes Rosi Vizcaino DO   fluticasone (FLOVENT HFA) 220 MCG/ACT inhaler Inhale 2 puffs into the lungs 2 times daily STOP prior prescription. New prescription reflects dose increase 3/4/24  Yes Rosi Vizcaino DO       Current medications:    Current Facility-Administered Medications   Medication Dose Route Frequency Provider Last Rate Last Admin    lactated ringers infusion   IntraVENous Continuous Shantel Johnston DO        sodium chloride flush 0.9 % injection 5-40 mL  5-40 mL IntraVENous 2 times per day Shantel Johnston,         sodium chloride flush 0.9 % injection 5-40 mL  5-40 mL IntraVENous PRN Shantel Johnston,         0.9 % sodium chloride infusion   IntraVENous PRN Shantel Johnston, DO           Allergies:    Allergies   Allergen Reactions    Cat Dander     Sulfa Antibiotics Other (See Comments)     Severe headache       Problem List:    Patient Active Problem List   Diagnosis Code    Vitamin D deficiency E55.9    Chronic pelvic pain in female R10.2, G89.29       Past Medical History:        Diagnosis Date    Asthma     diagnosed at age 17    Trigeminal neuralgia        Past Surgical History:        Procedure Laterality Date     SECTION      x1    DILATION AND CURETTAGE      LAPAROSCOPY  2017    filshie clip removed from pelvis, sitting on bladder    LAPAROTOMY      uterine rupture repair,

## 2025-01-28 NOTE — OP NOTE
Operative Note      Patient: Angeli Livingston  YOB: 1970  MRN: [id number]    Date of Procedure: 1/28/2025    Pre-Op Diagnosis Codes:      * Chronic pelvic pain in female [R10.2, G89.29]  Retained Filshie clip  Post-Op Diagnosis: Same, left hydrosalpinx       Procedure(s):  DIAGNOSTIC LAPAROSCOPY   LEFT PARTIAL SALPINGECTOMY    Surgeon(s):  Shantel Johnston DO    Assistant:   Dr. Stafford    Anesthesia: General    Estimated Blood Loss (mL): Minimal    Complications: None    Specimens:   * No specimens in log *    Implants:  * No implants in log *      Drains: * No LDAs found *    Findings:  Infection Present At Time Of Surgery (PATOS) (choose all levels that have infection present):  No infection present  Other Findings: The patient is a 54-year-old  female with normal age-specific female secondary sexual characteristics and escutcheon.  Vaginal mucosa and cervix were of normal morphology.  Uterus was anteverted, anteflexed, and oriented along the anterior midline.  Laparoscopic examination revealed a normal uterus and ovaries.  Right fallopian tube was normal.  There was a left hydrosalpinx.  The Filshie clip was not visualized.  There was no evidence of endometriosis or fibroid formation.    Detailed Description of Procedure:    The patient was taken to the operating room where general anesthesia was found to be adequate, placed in the dorsal lithotomy position.  Abdomen, perineum, and vagina were prepped and draped in the normal sterile fashion.  Trejo catheter was placed.  A Composerightka uterine manipulator was placed.  After changing my gloves, a 5 mm  incision was made at the umbilicus and a 5 mm trocar was placed under direct visualization.  Abdomen was insufflated with CO2 gas.  A 12 mm left lower quadrant port and a 5 mm right lower quadrant port were placed.  A thorough laparoscopic examination then ensued with the above noted findings.  The left proximal tube was noted to be dilated and was

## 2025-01-28 NOTE — ANESTHESIA POSTPROCEDURE EVALUATION
Department of Anesthesiology  Postprocedure Note    Patient: Angeli Livingston  MRN: [id number]  YOB: 1970  Date of evaluation: 1/28/2025    Procedure Summary       Date: 01/28/25 Room / Location: [address]    Anesthesia Start: 0656 Anesthesia Stop:     Procedure: DIAGNOSTIC LAPAROSCOPY PARTIAL LEFT SALPINGECTOMY Diagnosis:       Chronic pelvic pain in female      (Chronic pelvic pain in female [R10.2, G89.29])    Surgeons: Shantel Johnston DO Responsible Provider: Christiano Cardenas MD    Anesthesia Type: general ASA Status: 2            Anesthesia Type: No value filed.    Myah Phase I: Myah Score: 10    Myah Phase II:      Anesthesia Post Evaluation    Patient location during evaluation: PACU  Patient participation: waiting for patient participation  Level of consciousness: sleepy but conscious  Pain score: 0  Airway patency: patent  Nausea & Vomiting: no vomiting and no nausea  Cardiovascular status: hemodynamically stable  Respiratory status: face mask  Hydration status: stable  Pain management: adequate        No notable events documented.

## 2025-01-28 NOTE — H&P
Department of Gynecology   History and Physical        HISTORY OF PRESENT ILLNESS:                     The patient is a 54 y.o. female  who presents with complaint of pelvic pain and retained filshie clip. CT shows clip in left adnexa. Pt is requesting removal.        Past Medical History:        Diagnosis Date    Asthma     diagnosed at age 17    Trigeminal neuralgia      Past Surgical History:        Procedure Laterality Date     SECTION      x1    DILATION AND CURETTAGE      LAPAROSCOPY  2017    filshie clip removed from pelvis, sitting on bladder    LAPAROTOMY      uterine rupture repair, filshie clips at time of surgery    LEEP      TONSILLECTOMY      TUBAL LIGATION           OB History    Para Term  AB Living   5 5 5         SAB IAB Ectopic Molar Multiple Live Births             5      # Outcome Date GA Lbr Boo/2nd Weight Sex Type Anes PTL Lv   5 Term      Vag-Spont         Complications: Uterine Rupture   4 Term      Vag-Spont      3 Term      Vag-Spont      2 Term      CS-LTranv      1 Term      Vag-Spont          Medications Prior to Admission:   Medications Prior to Admission: albuterol sulfate HFA (PROVENTIL;VENTOLIN;PROAIR) 108 (90 Base) MCG/ACT inhaler, INHALE TWO PUFFS BY MOUTH EVERY 6 HOURS AS NEEDED FOR WHEEZING  fluticasone (FLOVENT HFA) 220 MCG/ACT inhaler, Inhale 2 puffs into the lungs 2 times daily STOP prior prescription. New prescription reflects dose increase    Allergies:  Cat dander and Sulfa antibiotics     Social History:  Social History     Socioeconomic History    Marital status:      Spouse name: None    Number of children: None    Years of education: None    Highest education level: None   Occupational History    Occupation: office work [employer]   Tobacco Use    Smoking status: Former     Current packs/day: 0.00     Average packs/day: 0.5 packs/day for 3.0 years (1.5 ttl pk-yrs)     Types: Cigarettes     Start date:

## 2025-01-30 ENCOUNTER — TELEPHONE (OUTPATIENT)
Dept: OBGYN | Age: 55
End: 2025-01-30

## 2025-01-30 LAB — SURGICAL PATHOLOGY REPORT: NORMAL

## 2025-01-30 NOTE — TELEPHONE ENCOUNTER
Patient left voiemail stating \" I have been taking Pepto Bismol as she has been having an upset stomach and that she has been taking Tyelnol 500 for pain.  She is asking if there is anything else that she should be taking.  Please advise

## 2025-01-30 NOTE — TELEPHONE ENCOUNTER
Patient notified of approval to increase Tylenol if needed. Patient also received the results from her surgery and wanted to discuss. I explained to patient that only the provider can explain and go into detail about those results. Confirmed with patient that she is scheduled for a Post-Op appointment on 2/11/25. Patient states she would like to know sooner than that.

## 2025-02-07 ENCOUNTER — OFFICE VISIT (OUTPATIENT)
Dept: PRIMARY CARE CLINIC | Age: 55
End: 2025-02-07
Payer: COMMERCIAL

## 2025-02-07 VITALS
BODY MASS INDEX: 25.4 KG/M2 | WEIGHT: 138 LBS | HEIGHT: 62 IN | RESPIRATION RATE: 18 BRPM | HEART RATE: 104 BPM | SYSTOLIC BLOOD PRESSURE: 130 MMHG | DIASTOLIC BLOOD PRESSURE: 86 MMHG | OXYGEN SATURATION: 95 % | TEMPERATURE: 97.7 F

## 2025-02-07 DIAGNOSIS — L28.2 PRURITIC RASH: Primary | ICD-10-CM

## 2025-02-07 PROCEDURE — 3017F COLORECTAL CA SCREEN DOC REV: CPT | Performed by: NURSE PRACTITIONER

## 2025-02-07 PROCEDURE — G8427 DOCREV CUR MEDS BY ELIG CLIN: HCPCS | Performed by: NURSE PRACTITIONER

## 2025-02-07 PROCEDURE — 99213 OFFICE O/P EST LOW 20 MIN: CPT | Performed by: NURSE PRACTITIONER

## 2025-02-07 PROCEDURE — 96372 THER/PROPH/DIAG INJ SC/IM: CPT | Performed by: NURSE PRACTITIONER

## 2025-02-07 PROCEDURE — 1036F TOBACCO NON-USER: CPT | Performed by: NURSE PRACTITIONER

## 2025-02-07 PROCEDURE — G8419 CALC BMI OUT NRM PARAM NOF/U: HCPCS | Performed by: NURSE PRACTITIONER

## 2025-02-07 RX ORDER — DEXAMETHASONE SODIUM PHOSPHATE 10 MG/ML
10 INJECTION INTRAMUSCULAR; INTRAVENOUS ONCE
Status: COMPLETED | OUTPATIENT
Start: 2025-02-07 | End: 2025-02-07

## 2025-02-07 RX ORDER — PREDNISONE 20 MG/1
20 TABLET ORAL 2 TIMES DAILY
Qty: 10 TABLET | Refills: 0 | Status: SHIPPED | OUTPATIENT
Start: 2025-02-07 | End: 2025-02-12

## 2025-02-07 RX ADMIN — DEXAMETHASONE SODIUM PHOSPHATE 10 MG: 10 INJECTION INTRAMUSCULAR; INTRAVENOUS at 16:59

## 2025-02-07 NOTE — PROGRESS NOTES
Chief Complaint:   Rash      History of Present Illness   Source of history provided by:  patient.      Angeli Livingston is a 54 y.o. old female who has a past medical history of:   Past Medical History:   Diagnosis Date    Asthma     diagnosed at age 17    Trigeminal neuralgia        Pt  presents [facility] complaint of a rash to the abdomen, which began several days ago.  The symptoms were triggered by possible adhesive bandage.  Pt recently had Laparoscopy left salpingectomy on 25. Pt has not had similar symptoms in the past.  Pt states the area is itchy, red, and has no noted streaking.  Denies any fever, chills, HA, recent illness, myalgias, vomiting, or lethargy.  Pt has been taking Benadryl w/o improvement     ROS    Unless otherwise stated in this report or unable to obtain because of the patient's clinical or mental status as evidenced by the medical record, this patients's positive and negative responses for Review of Systems, constitutional, psych, eyes, ENT, cardiovascular, respiratory, gastrointestinal, neurological, genitourinary, musculoskeletal, integument systems and systems related to the presenting problem are either stated in the preceding or were not pertinent or were negative for the symptoms and/or complaints related to the medical problem.    Past Surgical History:  has a past surgical history that includes Tonsillectomy;  section; Tubal ligation; laparotomy (); laparoscopy (); LEEP (); Dilation & curettage; and laparoscopy (N/A, 2025).  Social History:  reports that she quit smoking about 36 years ago. Her smoking use included cigarettes. She started smoking about 39 years ago. She has a 1.5 pack-year smoking history. She has never used smokeless tobacco. She reports that she does not drink alcohol and does not use drugs.  Family History: family history includes Breast Cancer in her paternal aunt and paternal grandmother; Breast Cancer (age of onset: 32)

## 2025-02-10 ENCOUNTER — OFFICE VISIT (OUTPATIENT)
Dept: ORTHOPEDIC SURGERY | Age: 55
End: 2025-02-10
Payer: COMMERCIAL

## 2025-02-10 VITALS
HEIGHT: 62 IN | HEART RATE: 69 BPM | OXYGEN SATURATION: 98 % | RESPIRATION RATE: 14 BRPM | TEMPERATURE: 97.5 F | WEIGHT: 138 LBS | DIASTOLIC BLOOD PRESSURE: 76 MMHG | SYSTOLIC BLOOD PRESSURE: 110 MMHG | BODY MASS INDEX: 25.4 KG/M2

## 2025-02-10 DIAGNOSIS — M25.552 PAIN OF LEFT HIP: ICD-10-CM

## 2025-02-10 DIAGNOSIS — M70.62 GREATER TROCHANTERIC BURSITIS OF LEFT HIP: Primary | ICD-10-CM

## 2025-02-10 PROCEDURE — 99213 OFFICE O/P EST LOW 20 MIN: CPT | Performed by: STUDENT IN AN ORGANIZED HEALTH CARE EDUCATION/TRAINING PROGRAM

## 2025-02-10 PROCEDURE — G8427 DOCREV CUR MEDS BY ELIG CLIN: HCPCS | Performed by: STUDENT IN AN ORGANIZED HEALTH CARE EDUCATION/TRAINING PROGRAM

## 2025-02-10 PROCEDURE — G8419 CALC BMI OUT NRM PARAM NOF/U: HCPCS | Performed by: STUDENT IN AN ORGANIZED HEALTH CARE EDUCATION/TRAINING PROGRAM

## 2025-02-10 PROCEDURE — 3017F COLORECTAL CA SCREEN DOC REV: CPT | Performed by: STUDENT IN AN ORGANIZED HEALTH CARE EDUCATION/TRAINING PROGRAM

## 2025-02-10 PROCEDURE — 1036F TOBACCO NON-USER: CPT | Performed by: STUDENT IN AN ORGANIZED HEALTH CARE EDUCATION/TRAINING PROGRAM

## 2025-02-10 NOTE — PROGRESS NOTES
[facility]  ORTHOPAEDICS    Follow Up Visit     CHIEF COMPLAINT:   Chief Complaint   Patient presents with    Follow-up     2 mo f/u L Hip. Patient states PT has helped with her hip pain       Angeli Livingston returns today for follow up visit regarding greater trochanteric bursitis of the left hip. . Treatment thus far has included Physical Therapy  with good improvement.  She reports symptoms are improved. She reports her pain and function is 50-75% better and is very pleased that the effectiveness of the physical therapy. Recently had surgery DIAGNOSTIC LAPAROSCOPY LEFT PARTIAL SALPINGECTOMY.\"     Physical Exam:     Height: 1.575 m (5' 2\"), Weight - Scale: 62.6 kg (138 lb), BP: 110/76    General: Alert and oriented x3, no acute distress  Cardiovascular/pulmonary: No labored breathing, peripheral perfusion intact  Musculoskeletal:    Physical Exam  Hip:  Left lower extremity  Skin intact  Compartment soft compressible  Neurovasc intact otherwise  Leg lengths equal  Full range of motion with internal/external rotation as well as flexion extension of the hip compared to the contralateral side  There is no evidence of groin pain with any type of range of motion of the hip  No tenderness palpation over greater trochanteric bursa  5 out of 5 strength testing      Controlled Substances Monitoring:      Imaging:    Results   previous imaging reviewed              Assesment/Plan:     Assessment & Plan  Left greater trochanteric bursitis    Patient's left greater trochanteric bursitis has substantially improved with doing physical therapy as well as her home exercise/stretching program.  At this point she feels she is doing so well she would like to follow-up as needed      Ko Villalobos, DO  Orthopaedic Surgery   2/10/25  10:06 AM

## 2025-02-11 ENCOUNTER — OFFICE VISIT (OUTPATIENT)
Dept: OBGYN | Age: 55
End: 2025-02-11

## 2025-02-11 VITALS
BODY MASS INDEX: 25.21 KG/M2 | WEIGHT: 137 LBS | HEART RATE: 80 BPM | SYSTOLIC BLOOD PRESSURE: 120 MMHG | HEIGHT: 62 IN | DIASTOLIC BLOOD PRESSURE: 78 MMHG | RESPIRATION RATE: 16 BRPM

## 2025-02-11 DIAGNOSIS — T19.9XXA FOREIGN BODY IN GENITOURINARY TRACT, INITIAL ENCOUNTER: ICD-10-CM

## 2025-02-11 DIAGNOSIS — G89.29 CHRONIC PELVIC PAIN IN FEMALE: Primary | ICD-10-CM

## 2025-02-11 DIAGNOSIS — R10.2 CHRONIC PELVIC PAIN IN FEMALE: Primary | ICD-10-CM

## 2025-02-11 PROCEDURE — 99024 POSTOP FOLLOW-UP VISIT: CPT | Performed by: OBSTETRICS & GYNECOLOGY

## 2025-02-11 NOTE — PROGRESS NOTES
HISTORY OF PRESENT ILLNESS:    54 y.o. female  here for post op appointment. The patient has no complaints. Pain is controlled. Denies vaginal bleeding. Tolerating regular diet. Denies gastrointestinal or urinary complaints.     Diagnosis: CPP, retained filshie clip, left hydrosalpinx  Surgery: DIAGNOSTIC LAPAROSCOPY   LEFT PARTIAL SALPINGECTOMY  Findings: left hydrosalpinx, filshie clip not found  Complications: none    Pt had rash after surgery. Went to pcp and got steroids. Improving.    Hip pain improving with PT. Not same as inside pelvic pain.    Past Medical History:   Past Medical History:   Diagnosis Date    Asthma     diagnosed at age 17    Trigeminal neuralgia                                              OB History    Para Term  AB Living   5 5 5         SAB IAB Ectopic Molar Multiple Live Births             5      # Outcome Date GA Lbr Boo/2nd Weight Sex Type Anes PTL Lv   5 Term      Vag-Spont         Complications: Uterine Rupture   4 Term      Vag-Spont      3 Term      Vag-Spont      2 Term      CS-LTranv      1 Term      Vag-Spont             Past Surgical History:   Past Surgical History:   Procedure Laterality Date     SECTION      x1    DILATION AND CURETTAGE      LAPAROSCOPY      filshie clip removed from pelvis, sitting on bladder    LAPAROSCOPY N/A 2025    DIAGNOSTIC LAPAROSCOPY PARTIAL LEFT SALPINGECTOMY performed by Shantel Johnston DO at [facility] OR    LAPAROTOMY      uterine rupture repair, filshie clips at time of surgery    LEEP      TONSILLECTOMY      TUBAL LIGATION          Allergies: Cat dander and Sulfa antibiotics     Medications:   Current Outpatient Medications   Medication Sig Dispense Refill    predniSONE (DELTASONE) 20 MG tablet Take 1 tablet by mouth 2 times daily for 5 days Start 25 10 tablet 0    albuterol sulfate HFA (PROVENTIL;VENTOLIN;PROAIR) 108 (90 Base) MCG/ACT inhaler INHALE TWO PUFFS BY MOUTH EVERY 6 HOURS

## 2025-05-13 ENCOUNTER — OFFICE VISIT (OUTPATIENT)
Dept: OBGYN | Age: 55
End: 2025-05-13
Payer: COMMERCIAL

## 2025-05-13 VITALS
OXYGEN SATURATION: 99 % | HEART RATE: 80 BPM | SYSTOLIC BLOOD PRESSURE: 93 MMHG | DIASTOLIC BLOOD PRESSURE: 63 MMHG | WEIGHT: 142.5 LBS | BODY MASS INDEX: 26.06 KG/M2

## 2025-05-13 DIAGNOSIS — Z12.11 SCREENING FOR COLORECTAL CANCER: ICD-10-CM

## 2025-05-13 DIAGNOSIS — Z01.419 ENCOUNTER FOR GYNECOLOGICAL EXAMINATION: Primary | ICD-10-CM

## 2025-05-13 DIAGNOSIS — R23.2 HOT FLASHES: ICD-10-CM

## 2025-05-13 DIAGNOSIS — E78.00 ELEVATED CHOLESTEROL: ICD-10-CM

## 2025-05-13 DIAGNOSIS — Z12.31 SCREENING MAMMOGRAM, ENCOUNTER FOR: ICD-10-CM

## 2025-05-13 DIAGNOSIS — Z12.12 SCREENING FOR COLORECTAL CANCER: ICD-10-CM

## 2025-05-13 PROCEDURE — 99396 PREV VISIT EST AGE 40-64: CPT | Performed by: OBSTETRICS & GYNECOLOGY

## 2025-05-13 NOTE — PROGRESS NOTES
HISTORY OF PRESENT ILLNESS:    54 y.o. female   presents for her annual exam.     Patient's last menstrual period was 2025 (approximate).  Periods are: irregular, last 2025  Contraception: tubal ligation  Number of new sexual partners: 0  Most recent pap smear:  normal cytology, no hpv  History of abnormal pap smears: yes when 21 yrs old, repeat normal  Most recent mammogram:  normal, dense tissue  History of abnormal mammogram: none  Most recent colonoscopy: none yet  Dexa scan: none, no family history of osteoporosis  HPV vaccination:   Changes to health since last visit: 0  Complaints: c/o vaginal dryness, hot flashes, mood swings, insomnia  Has new BF    Still has same pain left pelvis, not improved since surgery.     Past Medical History:   Past Medical History:   Diagnosis Date    Asthma     diagnosed at age 17    Trigeminal neuralgia                                              OB History    Para Term  AB Living   5 5 5      SAB IAB Ectopic Molar Multiple Live Births        5      # Outcome Date GA Lbr Boo/2nd Weight Sex Type Anes PTL Lv   5 Term      Vag-Spont         Complications: Uterine Rupture   4 Term      Vag-Spont      3 Term      Vag-Spont      2 Term      CS-LTranv      1 Term      Vag-Spont             Past Surgical History:   Past Surgical History:   Procedure Laterality Date     SECTION      x1    DILATION AND CURETTAGE      LAPAROSCOPY      filshie clip removed from pelvis, sitting on bladder    LAPAROSCOPY N/A 2025    DIAGNOSTIC LAPAROSCOPY PARTIAL LEFT SALPINGECTOMY performed by Shantel Johnston DO at [facility] OR    LAPAROTOMY      uterine rupture repair, filshie clips at time of surgery    LEEP      TONSILLECTOMY      TUBAL LIGATION          Allergies: Cat dander and Sulfa antibiotics     Medications:   Current Outpatient Medications   Medication Sig Dispense Refill    albuterol sulfate HFA

## 2025-05-13 NOTE — PROGRESS NOTES
Patient alert and pleasant with no complaints  Here today for annual GYN visit   Pelvic exam completed, pap smear obtained, labeled and sent to lab.  Breast exam completed.  Discharge instructions have been discussed with the patient. Patient advised to call our office with any questions or concerns.   Voiced understanding.

## 2025-05-14 LAB
HPV SAMPLE: NORMAL
HPV SOURCE: NORMAL
HPV, GENOTYPE 16: NORMAL
HPV, GENOTYPE 18: NORMAL
HPV, HIGH RISK OTHER: NORMAL
HPV, INTERPRETATION: NORMAL

## 2025-05-19 LAB
GYNECOLOGY CYTOLOGY REPORT: NORMAL
HPV SAMPLE: NORMAL
HPV SOURCE: NORMAL
HPV, GENOTYPE 16: NOT DETECTED
HPV, GENOTYPE 18: NOT DETECTED
HPV, HIGH RISK OTHER: NOT DETECTED
HPV, INTERPRETATION: NORMAL

## 2025-06-07 ENCOUNTER — HOSPITAL ENCOUNTER (OUTPATIENT)
Age: 55
Discharge: HOME | End: 2025-06-07
Payer: COMMERCIAL

## 2025-06-07 DIAGNOSIS — R23.2 HOT FLASHES: ICD-10-CM

## 2025-06-07 DIAGNOSIS — E78.00 ELEVATED CHOLESTEROL: ICD-10-CM

## 2025-06-07 LAB
CHOLEST SERPL-MCNC: 181 MG/DL
ESTRADIOL LEVEL: 120 PG/ML
FSH SERPL-ACNC: 4.3 MIU/ML
HDLC SERPL-MCNC: 66 MG/DL
LDLC SERPL CALC-MCNC: 98 MG/DL
T4 FREE SERPL-MCNC: 1 NG/DL (ref 0.9–1.7)
TRIGL SERPL-MCNC: 89 MG/DL
TSH SERPL DL<=0.05 MIU/L-ACNC: 2.34 UIU/ML (ref 0.27–4.2)
VLDLC SERPL CALC-MCNC: 18 MG/DL

## 2025-06-07 PROCEDURE — 84443 ASSAY THYROID STIM HORMONE: CPT

## 2025-06-07 PROCEDURE — 84439 ASSAY OF FREE THYROXINE: CPT

## 2025-06-07 PROCEDURE — 82670 ASSAY OF TOTAL ESTRADIOL: CPT

## 2025-06-07 PROCEDURE — 36415 COLL VENOUS BLD VENIPUNCTURE: CPT

## 2025-06-07 PROCEDURE — 83001 ASSAY OF GONADOTROPIN (FSH): CPT

## 2025-06-07 PROCEDURE — 80061 LIPID PANEL: CPT

## 2025-06-11 ENCOUNTER — OFFICE VISIT (OUTPATIENT)
Dept: OBGYN | Age: 55
End: 2025-06-11
Payer: COMMERCIAL

## 2025-06-11 VITALS
WEIGHT: 141.8 LBS | SYSTOLIC BLOOD PRESSURE: 94 MMHG | BODY MASS INDEX: 25.94 KG/M2 | OXYGEN SATURATION: 99 % | HEART RATE: 76 BPM | DIASTOLIC BLOOD PRESSURE: 56 MMHG

## 2025-06-11 DIAGNOSIS — R23.2 HOT FLASHES: Primary | ICD-10-CM

## 2025-06-11 PROCEDURE — 3017F COLORECTAL CA SCREEN DOC REV: CPT | Performed by: OBSTETRICS & GYNECOLOGY

## 2025-06-11 PROCEDURE — G8427 DOCREV CUR MEDS BY ELIG CLIN: HCPCS | Performed by: OBSTETRICS & GYNECOLOGY

## 2025-06-11 PROCEDURE — G8419 CALC BMI OUT NRM PARAM NOF/U: HCPCS | Performed by: OBSTETRICS & GYNECOLOGY

## 2025-06-11 PROCEDURE — 99213 OFFICE O/P EST LOW 20 MIN: CPT | Performed by: OBSTETRICS & GYNECOLOGY

## 2025-06-11 PROCEDURE — 1036F TOBACCO NON-USER: CPT | Performed by: OBSTETRICS & GYNECOLOGY

## 2025-06-11 RX ORDER — ESTRADIOL 0.1 MG/G
1 CREAM VAGINAL SEE ADMIN INSTRUCTIONS
Qty: 45 G | Refills: 3 | Status: SHIPPED | OUTPATIENT
Start: 2025-06-11

## 2025-06-11 NOTE — PROGRESS NOTES
Patient alert and pleasant with no complaints  Here today to discuss lab results.  Discharge instructions have been discussed with the patient. Patient advised to call our office with any questions or concerns.   Voiced understanding.   
Grandmother     Cancer Paternal Grandfather          at 68 prostrate and lung cancer    Prostate Cancer Paternal Grandfather     Breast Cancer Paternal Grandmother         Great Grandma  at age 32 from breast cancer    Breast Cancer Paternal Aunt         great aunt-- (great grandmas daughter/grandpas sister) double mastectomy in 50s    Cancer Paternal Aunt         Hodgkins's lymphoma in 30s;  came back 5 times; eventually had most of throat removed; fed from tube in her side last 7 years of life; last reoccurrence on tongue and throat spread to brain.    Kidney Disease Paternal Aunt         kidney stones       REVIEW OF SYSTEMS:    Constitutional: negative  HEENT: negative  Breast: negative  Respiratory: negative  Cardiovascular: negative  Gastrointestinal: negative  Genitourinary:negative  Integument: negative  Neurological: negative  Endocrine: negative          PHYSICAL EXAM  BP (!) 94/56 (BP Site: Left Upper Arm, Patient Position: Sitting)   Pulse 76   Wt 64.3 kg (141 lb 12.8 oz)   LMP 2025 (Approximate)   SpO2 99%   BMI 25.94 kg/m²   Patient's last menstrual period was 2025 (approximate).    General appearance: alert, cooperative and in no acute distress.  Head: NCAT   Psych: No acute distress, mood and affect full range  Neuro: Alert and oriented, no focal deficits          ASSESSMENT :   Diagnosis Orders   1. Hot flashes             PLAN:    Return in about 6 weeks (around 2025) for Medication check.  Info given on effexor, HRT, and gabapentin.    Orders Placed This Encounter   Medications    estradiol (ESTRACE VAGINAL) 0.1 MG/GM vaginal cream     Sig: Place 1 g vaginally See Admin Instructions Place 1g vaginally x2 weeks, then 2-3x a week.     Dispense:  45 g     Refill:  3       No orders of the defined types were placed in this encounter.        Electronically signed by Shantel Johnston DO on 25

## 2025-06-18 ENCOUNTER — HOSPITAL ENCOUNTER (OUTPATIENT)
Dept: GENERAL RADIOLOGY | Age: 55
Discharge: HOME | End: 2025-06-20
Payer: COMMERCIAL

## 2025-06-18 VITALS — BODY MASS INDEX: 25.95 KG/M2 | WEIGHT: 141 LBS | HEIGHT: 62 IN

## 2025-06-18 DIAGNOSIS — R92.30 DENSE BREAST TISSUE ON MAMMOGRAM, UNSPECIFIED TYPE: ICD-10-CM

## 2025-06-18 PROCEDURE — 76641 ULTRASOUND BREAST COMPLETE: CPT

## (undated) DEVICE — TROCAR: Brand: KII® SLEEVE

## (undated) DEVICE — BLADE,STAINLESS-STEEL,11,STRL,DISPOSABLE: Brand: MEDLINE

## (undated) DEVICE — APPLICATOR MEDICATED 26 CC SOLUTION HI LT ORNG CHLORAPREP

## (undated) DEVICE — Device

## (undated) DEVICE — PAD,SANITARY,11 IN,MAXI,N-STRL,IND WRAP: Brand: MEDLINE

## (undated) DEVICE — COVER,LIGHT HANDLE,FLX,2/PK: Brand: MEDLINE INDUSTRIES, INC.

## (undated) DEVICE — LIQUIBAND RAPID ADHESIVE 36/CS 0.8ML: Brand: MEDLINE

## (undated) DEVICE — ELECTRODE PT RET AD L9FT HI MOIST COND ADH HYDRGEL CORDED

## (undated) DEVICE — 4-PORT MANIFOLD: Brand: NEPTUNE 2

## (undated) DEVICE — SYRINGE MED 10ML LUERLOCK TIP W/O SFTY DISP

## (undated) DEVICE — LAPAROSCOPIC SCISSORS: Brand: EPIX LAPAROSCOPIC SCISSORS

## (undated) DEVICE — GLOVE SURG SZ 6 THK91MIL LTX FREE SYN POLYISOPRENE ANTI

## (undated) DEVICE — SYRINGE 20ML LL S/C 50

## (undated) DEVICE — SEALER ENDOSCP L37CM NANO COAT BLNT TIP LAP DIV

## (undated) DEVICE — TROCAR: Brand: KII FIOS FIRST ENTRY

## (undated) DEVICE — GOWN,SIRUS,FABRNF,XL,20/CS: Brand: MEDLINE

## (undated) DEVICE — TOWEL,OR,DSP,ST,BLUE,STD,6/PK,12PK/CS: Brand: MEDLINE

## (undated) DEVICE — CATHETER,FOLEY,SILI-ELAST,LTX,16FR,10ML: Brand: MEDLINE

## (undated) DEVICE — GOWN,SIRUS,FABRNF,L,20/CS: Brand: MEDLINE

## (undated) DEVICE — INSUFFLATION TUBING SET WITH FILTER, FUNNEL CONNECTOR AND LUER LOCK: Brand: JOSNOE MEDICAL INC

## (undated) DEVICE — DOUBLE BASIN SET: Brand: MEDLINE INDUSTRIES, INC.

## (undated) DEVICE — VAGINAL PREP TRAY: Brand: MEDLINE INDUSTRIES, INC.

## (undated) DEVICE — PACK,AURORA,LAVH: Brand: MEDLINE

## (undated) DEVICE — KIT,ANTI FOG,W/SPONGE & FLUID,SOFT PACK: Brand: MEDLINE

## (undated) DEVICE — DRAINBAG,ANTI-REFLUX TOWER,L/F,2000ML,LL: Brand: MEDLINE